# Patient Record
Sex: FEMALE | Race: WHITE | NOT HISPANIC OR LATINO | Employment: OTHER | URBAN - METROPOLITAN AREA
[De-identification: names, ages, dates, MRNs, and addresses within clinical notes are randomized per-mention and may not be internally consistent; named-entity substitution may affect disease eponyms.]

---

## 2021-06-03 PROBLEM — I44.7 LEFT BUNDLE BRANCH BLOCK: Status: ACTIVE | Noted: 2021-06-03

## 2021-06-03 PROBLEM — Z86.59 HISTORY OF DEPRESSION: Status: ACTIVE | Noted: 2021-06-03

## 2021-06-03 PROBLEM — K21.9 GASTROESOPHAGEAL REFLUX DISEASE: Status: ACTIVE | Noted: 2021-06-03

## 2021-06-03 PROBLEM — I10 ESSENTIAL HYPERTENSION: Status: ACTIVE | Noted: 2021-06-03

## 2021-06-03 PROBLEM — E78.00 HYPERCHOLESTEROLEMIA: Status: ACTIVE | Noted: 2020-08-13

## 2021-06-03 PROBLEM — G47.33 OBSTRUCTIVE SLEEP APNEA SYNDROME: Status: ACTIVE | Noted: 2021-06-03

## 2021-06-03 PROBLEM — I25.10 CORONARY ARTERIOSCLEROSIS IN NATIVE ARTERY: Status: ACTIVE | Noted: 2021-06-03

## 2021-06-03 PROBLEM — I50.22 CHRONIC SYSTOLIC HEART FAILURE (HCC): Status: ACTIVE | Noted: 2021-06-03

## 2021-06-03 PROBLEM — I49.3 VENTRICULAR PREMATURE BEATS: Status: ACTIVE | Noted: 2021-06-03

## 2021-06-03 NOTE — PROGRESS NOTES
Chief Complaint  Coronary Artery Disease and Establish Care    Subjective    History of Present Illness {CC  Problem List  Visit  Diagnosis   Encounters  Notes  Medications  Labs  Result Review Imaging  Media :23}     Estefani Jordan presents to [facility] for   History of Present Illness   80-year-old female with hypertension, hyperlipidemia, coronary artery disease s/p stents, chronic systolic heart failure, PVCs status post ablation 2015. Patient has history of syncope and was found to have frequent PVCs around 2013/2014.  Had an abnormal stress test and underwent left heart cath in 2015 showing severe mid LAD stenosis status post drug-eluting stent, EF 25% at the time.  Underwent EPS/PVC ablation in 2015.  Last nuclear stress test was in September 2020 which showed normal LVEF and no evidence of ischemia.    She reports over the past year and a half she has had some worsening exertional dyspnea.  This was around the time that she had her nuclear stress test.  She feels her dyspnea is been stable and wonders if it might be related to her history of lung nodule.  Exertional dyspnea typically happens when she climbs stairs but is able to do her routine activities of daily living.  She reports she saw a pulmonologist and was told that her pulmonary function was decreased.  She notes some intermittent palpitations, worse at night when at rest.  She notes some intermittent feelings of being off balance.  Her  reports that she has had some chronic memory loss and feeling dizzy since she had an intracranial hemorrhage years ago.  She denies any chest pain.  Denies any lower extremity edema, orthopnea or PND. No syncope.  Her  reports that she has had increasing fatigue over the past 6 months and now sleeps for almost the entire day.        Objective     Vital Signs:   Vitals:    06/04/21 1427 06/04/21 1428 06/04/21 1429   BP: 163/72 151/71 162/75   BP Location: Right  "arm Left arm Left arm   Patient Position: Sitting Sitting Standing   Cuff Size: Adult Adult Adult   Pulse: 51 58 67   Resp:   17   Temp:   97.3 °F (36.3 °C)   TempSrc:   Temporal   SpO2: 95% 98% 97%   Weight:   66.2 kg (146 lb)   Height:   154.9 cm (61\")     Body mass index is 27.59 kg/m².  Physical Exam  Vitals reviewed.   Constitutional:       Appearance: Normal appearance.   HENT:      Head: Normocephalic.   Eyes:      Extraocular Movements: Extraocular movements intact.      Pupils: Pupils are equal, round, and reactive to light.   Neck:      Vascular: No carotid bruit.   Cardiovascular:      Rate and Rhythm: Normal rate and regular rhythm. Frequent extrasystoles are present.     Pulses: Normal pulses.      Heart sounds: Normal heart sounds, S1 normal and S2 normal. No murmur heard.     Pulmonary:      Effort: Pulmonary effort is normal. No respiratory distress.      Breath sounds: Normal breath sounds.   Chest:      Chest wall: No tenderness.   Abdominal:      General: Abdomen is flat. Bowel sounds are normal.      Palpations: Abdomen is soft.   Musculoskeletal:      Cervical back: Neck supple.      Right lower leg: No edema.      Left lower leg: No edema.   Skin:     General: Skin is warm and dry.   Neurological:      General: No focal deficit present.      Mental Status: She is alert and oriented to person, place, and time. Mental status is at baseline.   Psychiatric:         Mood and Affect: Mood normal.         Behavior: Behavior normal.         Thought Content: Thought content normal.              Result Review  Data Reviewed:{ Labs  Result Review  Imaging  Med Tab  Media :23}     Reviewed records from [facility] in [place], [place]      Labs 5/12/2021 glucose 95, BUN 19, creatinine 0.88, sodium 141, potassium 4.4, chloride 105, carbon oxide 25, AST, ALT.total cholesterol 132, triglycerides 126, HDL 45, LDL 65.  TSH 0.765      EKG today shows sinus bradycardia with first-degree AV block with " occasional PVCs, left axis deviation, left bundle branch block.  , QTc 495    Assessment and Plan {CC Problem List  Visit Diagnosis  ROS  Review (Popup)  Health Maintenance  Quality  BestPractice  Medications  SmartSets  SnapShot Encounters  Media :23}   1. PVC's (premature ventricular contractions)  History of PVC ablation. Will do holter monitor to re-evaluate PVC burden and see if this is contributing to symptoms  She reports she is on digoxin since her heart cath.  She has no history of A. fib.  At this time, risks likely outweigh the benefit of digoxin and therefore we will go ahead and stop.  We will not increase her metoprolol at this time due to history of first-degree AV block.  Can consider it based on Holter results  - ECG 12 Lead; Future  - Holter Monitor - 72 Hour Up To 15 Days; Future  - Adult Transthoracic Echo Complete W/ Cont if Necessary Per Protocol; Future    2. VALENZUELA (dyspnea on exertion)  Reviewed her records it appears this is been a chronic issue for her.  Possibly could be pulmonary related.  Last echo in 2018.  Will repeat echo to make sure her EF is stable  - Adult Transthoracic Echo Complete W/ Cont if Necessary Per Protocol; Future    3. Coronary arteriosclerosis in native artery  She does have some stable exertional dyspnea but no other symptoms of angina.  Her stress test was normal September 2020.  Continue statin, beta-blocker and aspirin  - Adult Transthoracic Echo Complete W/ Cont if Necessary Per Protocol; Future    4. Chronic systolic heart failure (CMS/HCC)  EF normalized after PVC ablation  - Adult Transthoracic Echo Complete W/ Cont if Necessary Per Protocol; Future    5. Essential hypertension  Mildly elevated today but she reports that blood pressure is typically well controlled and less than 120 when she checks.  I advised them to continue to monitor blood pressure regularly  - Adult Transthoracic Echo Complete W/ Cont if Necessary Per Protocol;  Future    6. Dizziness  It appears this may be a chronic issue and more of an imbalance rather than a dizziness.  Possibly related to history of ICH  - Holter Monitor - 72 Hour Up To 15 Days; Future  - Adult Transthoracic Echo Complete W/ Cont if Necessary Per Protocol; Future    Patient to see Dr. Wright in August    Follow Up {Instructions Charge Capture  Follow-up Communications :23}   Return in about 6 weeks (around 7/16/2021) for Monitor results, Office follow up. Follow up med changes.    Patient was given instructions and counseling regarding her condition or for health maintenance advice. Please see specific information pulled into the AVS if appropriate.  Patient was instructed to call the [facility] with any questions, concerns, or worsening symptoms.    *Please note that portions of this note were completed with a voice recognition program. Efforts were made to edit the dictations, but occasionally words are mistranscribed.

## 2021-06-04 ENCOUNTER — HOSPITAL ENCOUNTER (OUTPATIENT)
Dept: CARDIOLOGY | Facility: HOSPITAL | Age: 81
Discharge: HOME | End: 2021-06-04

## 2021-06-04 ENCOUNTER — OFFICE VISIT (OUTPATIENT)
Dept: CARDIOLOGY | Facility: HOSPITAL | Age: 81
End: 2021-06-04

## 2021-06-04 VITALS
SYSTOLIC BLOOD PRESSURE: 162 MMHG | WEIGHT: 146 LBS | BODY MASS INDEX: 27.56 KG/M2 | HEART RATE: 67 BPM | OXYGEN SATURATION: 97 % | RESPIRATION RATE: 17 BRPM | HEIGHT: 61 IN | DIASTOLIC BLOOD PRESSURE: 75 MMHG | TEMPERATURE: 97.3 F

## 2021-06-04 DIAGNOSIS — I49.3 PVC'S (PREMATURE VENTRICULAR CONTRACTIONS): ICD-10-CM

## 2021-06-04 DIAGNOSIS — R42 DIZZINESS: ICD-10-CM

## 2021-06-04 DIAGNOSIS — I25.10 CORONARY ARTERIOSCLEROSIS IN NATIVE ARTERY: ICD-10-CM

## 2021-06-04 DIAGNOSIS — R06.09 DOE (DYSPNEA ON EXERTION): ICD-10-CM

## 2021-06-04 DIAGNOSIS — I50.22 CHRONIC SYSTOLIC HEART FAILURE (HCC): ICD-10-CM

## 2021-06-04 DIAGNOSIS — I49.3 PVC'S (PREMATURE VENTRICULAR CONTRACTIONS): Primary | ICD-10-CM

## 2021-06-04 DIAGNOSIS — I10 ESSENTIAL HYPERTENSION: ICD-10-CM

## 2021-06-04 LAB
QT INTERVAL: 518 MS
QTC INTERVAL: 495 MS

## 2021-06-04 PROCEDURE — 99204 OFFICE O/P NEW MOD 45 MIN: CPT | Performed by: NURSE PRACTITIONER

## 2021-06-04 PROCEDURE — 93010 ELECTROCARDIOGRAM REPORT: CPT | Performed by: INTERNAL MEDICINE

## 2021-06-04 PROCEDURE — 93005 ELECTROCARDIOGRAM TRACING: CPT | Performed by: NURSE PRACTITIONER

## 2021-06-04 PROCEDURE — 93246 EXT ECG>7D<15D RECORDING: CPT

## 2021-06-04 RX ORDER — METOPROLOL SUCCINATE 100 MG/1
100 TABLET, EXTENDED RELEASE ORAL DAILY
Qty: 90 TABLET | Refills: 1 | Status: SHIPPED | OUTPATIENT
Start: 2021-06-04 | End: 2021-06-04 | Stop reason: SDUPTHER

## 2021-06-04 RX ORDER — METOPROLOL SUCCINATE 50 MG/1
50 TABLET, EXTENDED RELEASE ORAL DAILY
Qty: 90 TABLET | Refills: 0 | Status: SHIPPED | OUTPATIENT
Start: 2021-06-04 | End: 2021-07-08 | Stop reason: SDUPTHER

## 2021-06-04 RX ORDER — DIGOXIN 125 MCG
125 TABLET ORAL DAILY
COMMUNITY
End: 2021-06-04

## 2021-06-04 RX ORDER — ATORVASTATIN CALCIUM 20 MG/1
20 TABLET, FILM COATED ORAL DAILY
COMMUNITY

## 2021-06-04 RX ORDER — ASPIRIN 81 MG/1
81 TABLET ORAL DAILY
COMMUNITY

## 2021-06-04 RX ORDER — LEVOTHYROXINE SODIUM 88 UG/1
88 TABLET ORAL DAILY
COMMUNITY

## 2021-06-04 RX ORDER — CETIRIZINE HYDROCHLORIDE 10 MG/1
10 TABLET ORAL DAILY
COMMUNITY
End: 2022-10-20 | Stop reason: ALTCHOICE

## 2021-06-04 RX ORDER — METOPROLOL SUCCINATE 50 MG/1
50 TABLET, EXTENDED RELEASE ORAL DAILY
COMMUNITY
End: 2021-06-04 | Stop reason: SDUPTHER

## 2021-06-04 RX ORDER — ALENDRONATE SODIUM 70 MG/1
70 TABLET ORAL WEEKLY
COMMUNITY
End: 2022-10-20 | Stop reason: ALTCHOICE

## 2021-06-04 NOTE — PROGRESS NOTES
[facility] Heart Monitor Documentation    Estefani Jordan  1940  [id number]  06/04/21    ROSALBA Rudd    [] ZIO XT Patch  Model [id number] Prescribed for N/A Days    · Serial Number: (N + 9 Digits) N   · Apply-By Date on Box:   · USPS Tracking Number:   · USPS Tracking        [] [employer] BodyGuardian MINI PLUS Mobile Cardiac Telemetry  Model BGMINIPLUS Prescribed for N/A Days    · Serial Number: (BGM + 7 Digits) BGM  · Shipped-By Date on Box:   · UPS Tracking Number: [id number]  · UPS Tracking      [] [employer]ian MINI Holter Monitor  Model BGMINIEL Prescribed for 14 Days    · Serial Number: (7 Digits) [id number]  · Shipped-By Date on Box: 825729  · UPS Tracking Number: [id number]  · UPS Tracking        This monitor was applied to the patient's chest and checked for proper functioning.  Ms. Estefani Jordan was instructed in the proper use of this monitor.  She was given the opportunity to ask questions and left the office with the device 's instruction manual.    Alda Arias MA, 15:17 EDT, 06/04/21                  [facility] 8[facility]

## 2021-07-02 PROCEDURE — 93248 EXT ECG>7D<15D REV&INTERPJ: CPT | Performed by: INTERNAL MEDICINE

## 2021-07-08 ENCOUNTER — TELEPHONE (OUTPATIENT)
Dept: CARDIOLOGY | Facility: HOSPITAL | Age: 81
End: 2021-07-08

## 2021-07-08 RX ORDER — METOPROLOL SUCCINATE 50 MG/1
75 TABLET, EXTENDED RELEASE ORAL DAILY
Refills: 0 | Status: ON HOLD
Start: 2021-07-08 | End: 2021-07-22 | Stop reason: SDUPTHER

## 2021-07-08 NOTE — TELEPHONE ENCOUNTER
Called patient with heart monitor results.  Advised of frequent PVCs.  Discussed with , who recommends increasing metoprolol to 75mg daily.  She reports that she feels the palpitations, especially with activity.  She reports that her heart races with minimal activity.  I advised her to continue to monitor heart rates and if she has dizziness, lightheadedness or heart rates consistently less than 55 to let me know.  If she continues to be symptomatic, may need to consider Multaq.  She has an upcoming echo on Friday.  Patient to keep her follow-up with me next week.  She verbalized understanding with no further questions or concerns.  Of note, I reviewed her labs from May and she had a normal TSH and BMP.  We draw magnesium level when she comes into the office next

## 2021-07-09 ENCOUNTER — HOSPITAL ENCOUNTER (OUTPATIENT)
Dept: CARDIOLOGY | Facility: HOSPITAL | Age: 81
Discharge: HOME | End: 2021-07-09
Admitting: NURSE PRACTITIONER

## 2021-07-09 VITALS
DIASTOLIC BLOOD PRESSURE: 83 MMHG | SYSTOLIC BLOOD PRESSURE: 137 MMHG | WEIGHT: 146 LBS | BODY MASS INDEX: 27.56 KG/M2 | HEIGHT: 61 IN

## 2021-07-09 DIAGNOSIS — R42 DIZZINESS: ICD-10-CM

## 2021-07-09 DIAGNOSIS — I49.3 PVC'S (PREMATURE VENTRICULAR CONTRACTIONS): ICD-10-CM

## 2021-07-09 DIAGNOSIS — I25.10 CORONARY ARTERIOSCLEROSIS IN NATIVE ARTERY: ICD-10-CM

## 2021-07-09 DIAGNOSIS — I50.22 CHRONIC SYSTOLIC HEART FAILURE (HCC): ICD-10-CM

## 2021-07-09 DIAGNOSIS — I10 ESSENTIAL HYPERTENSION: ICD-10-CM

## 2021-07-09 DIAGNOSIS — R06.09 DOE (DYSPNEA ON EXERTION): ICD-10-CM

## 2021-07-09 PROCEDURE — 25010000002 SULFUR HEXAFLUORIDE MICROSPH 60.7-25 MG RECONSTITUTED SUSPENSION: Performed by: NURSE PRACTITIONER

## 2021-07-09 PROCEDURE — 93306 TTE W/DOPPLER COMPLETE: CPT | Performed by: INTERNAL MEDICINE

## 2021-07-09 PROCEDURE — 93306 TTE W/DOPPLER COMPLETE: CPT

## 2021-07-09 RX ADMIN — SULFUR HEXAFLUORIDE 2 ML: KIT at 17:33

## 2021-07-12 ENCOUNTER — TELEPHONE (OUTPATIENT)
Dept: CARDIOLOGY | Facility: HOSPITAL | Age: 81
End: 2021-07-12

## 2021-07-12 DIAGNOSIS — I25.708 CORONARY ARTERY DISEASE OF BYPASS GRAFT OF NATIVE HEART WITH STABLE ANGINA PECTORIS (HCC): ICD-10-CM

## 2021-07-12 DIAGNOSIS — I49.3 FREQUENT PVCS: ICD-10-CM

## 2021-07-12 DIAGNOSIS — I50.21 ACUTE SYSTOLIC CHF (CONGESTIVE HEART FAILURE) (HCC): Primary | ICD-10-CM

## 2021-07-12 LAB
BH CV ECHO MEAS - AO MAX PG (FULL): 7.5 MMHG
BH CV ECHO MEAS - AO MAX PG: 8.9 MMHG
BH CV ECHO MEAS - AO MEAN PG (FULL): 3.3 MMHG
BH CV ECHO MEAS - AO MEAN PG: 4.1 MMHG
BH CV ECHO MEAS - AO ROOT AREA (BSA CORRECTED): 1.8
BH CV ECHO MEAS - AO ROOT AREA: 7.2 CM^2
BH CV ECHO MEAS - AO ROOT DIAM: 3 CM
BH CV ECHO MEAS - AO V2 MAX: 149.3 CM/SEC
BH CV ECHO MEAS - AO V2 MEAN: 92.6 CM/SEC
BH CV ECHO MEAS - AO V2 VTI: 39.5 CM
BH CV ECHO MEAS - ASC AORTA: 3 CM
BH CV ECHO MEAS - AVA(I,A): 1.7 CM^2
BH CV ECHO MEAS - AVA(I,D): 1.7 CM^2
BH CV ECHO MEAS - AVA(V,A): 1.7 CM^2
BH CV ECHO MEAS - AVA(V,D): 1.7 CM^2
BH CV ECHO MEAS - BSA(HAYCOCK): 1.7 M^2
BH CV ECHO MEAS - BSA: 1.7 M^2
BH CV ECHO MEAS - BZI_BMI: 27.6 KILOGRAMS/M^2
BH CV ECHO MEAS - BZI_METRIC_HEIGHT: 154.9 CM
BH CV ECHO MEAS - BZI_METRIC_WEIGHT: 66.2 KG
BH CV ECHO MEAS - EDV(CUBED): 157.1 ML
BH CV ECHO MEAS - EDV(MOD-SP2): 132 ML
BH CV ECHO MEAS - EDV(MOD-SP4): 156 ML
BH CV ECHO MEAS - EDV(TEICH): 141 ML
BH CV ECHO MEAS - EF(CUBED): 42 %
BH CV ECHO MEAS - EF(MOD-BP): 30 %
BH CV ECHO MEAS - EF(MOD-SP2): 27.3 %
BH CV ECHO MEAS - EF(MOD-SP4): 30.1 %
BH CV ECHO MEAS - EF(TEICH): 34.4 %
BH CV ECHO MEAS - ESV(CUBED): 91.1 ML
BH CV ECHO MEAS - ESV(MOD-SP2): 96 ML
BH CV ECHO MEAS - ESV(MOD-SP4): 109 ML
BH CV ECHO MEAS - ESV(TEICH): 92.5 ML
BH CV ECHO MEAS - FS: 16.6 %
BH CV ECHO MEAS - IVS/LVPW: 0.99
BH CV ECHO MEAS - IVSD: 1 CM
BH CV ECHO MEAS - LA DIMENSION: 4.1 CM
BH CV ECHO MEAS - LA/AO: 1.3
BH CV ECHO MEAS - LAD MAJOR: 5.4 CM
BH CV ECHO MEAS - LAT PEAK E' VEL: 5.6 CM/SEC
BH CV ECHO MEAS - LATERAL E/E' RATIO: 17.5
BH CV ECHO MEAS - LV DIASTOLIC VOL/BSA (35-75): 94.4 ML/M^2
BH CV ECHO MEAS - LV MASS(C)D: 221.1 GRAMS
BH CV ECHO MEAS - LV MASS(C)DI: 133.8 GRAMS/M^2
BH CV ECHO MEAS - LV MAX PG: 1.4 MMHG
BH CV ECHO MEAS - LV MEAN PG: 0.81 MMHG
BH CV ECHO MEAS - LV SYSTOLIC VOL/BSA (12-30): 66 ML/M^2
BH CV ECHO MEAS - LV V1 MAX: 59.7 CM/SEC
BH CV ECHO MEAS - LV V1 MEAN: 42.7 CM/SEC
BH CV ECHO MEAS - LV V1 VTI: 15.3 CM
BH CV ECHO MEAS - LVIDD: 5.4 CM
BH CV ECHO MEAS - LVIDS: 4.5 CM
BH CV ECHO MEAS - LVLD AP2: 7.8 CM
BH CV ECHO MEAS - LVLD AP4: 7.4 CM
BH CV ECHO MEAS - LVLS AP2: 7 CM
BH CV ECHO MEAS - LVLS AP4: 6.9 CM
BH CV ECHO MEAS - LVOT AREA (M): 4.2 CM^2
BH CV ECHO MEAS - LVOT AREA: 4.2 CM^2
BH CV ECHO MEAS - LVOT DIAM: 2.3 CM
BH CV ECHO MEAS - LVPWD: 1.1 CM
BH CV ECHO MEAS - MED PEAK E' VEL: 3.2 CM/SEC
BH CV ECHO MEAS - MEDIAL E/E' RATIO: 31.2
BH CV ECHO MEAS - MR ALIAS VEL: 31.9 CM/SEC
BH CV ECHO MEAS - MR ERO: 0.14 CM^2
BH CV ECHO MEAS - MR FLOW RATE: 73.3 CM^3/SEC
BH CV ECHO MEAS - MR MAX PG: 104 MMHG
BH CV ECHO MEAS - MR MAX VEL: 505.9 CM/SEC
BH CV ECHO MEAS - MR MEAN PG: 62.3 MMHG
BH CV ECHO MEAS - MR MEAN VEL: 368.4 CM/SEC
BH CV ECHO MEAS - MR PISA RADIUS: 0.6 CM
BH CV ECHO MEAS - MR PISA: 2.3 CM^2
BH CV ECHO MEAS - MR VOLUME: 33.7 ML
BH CV ECHO MEAS - MR VTI: 232.7 CM
BH CV ECHO MEAS - MV A MAX VEL: 101.2 CM/SEC
BH CV ECHO MEAS - MV AREA (1 DIAM): 9.1 CM^2
BH CV ECHO MEAS - MV DEC SLOPE: 357.3 CM/SEC^2
BH CV ECHO MEAS - MV DEC TIME: 0.16 SEC
BH CV ECHO MEAS - MV DIAM: 3.4 CM
BH CV ECHO MEAS - MV E MAX VEL: 100.7 CM/SEC
BH CV ECHO MEAS - MV E/A: 1
BH CV ECHO MEAS - MV FLOW AREA(1DIAM): 9.1 CM^2
BH CV ECHO MEAS - MV MAX PG: 3.9 MMHG
BH CV ECHO MEAS - MV MEAN PG: 1.9 MMHG
BH CV ECHO MEAS - MV P1/2T MAX VEL: 82.8 CM/SEC
BH CV ECHO MEAS - MV P1/2T: 67.9 MSEC
BH CV ECHO MEAS - MV V2 MAX: 98.4 CM/SEC
BH CV ECHO MEAS - MV V2 MEAN: 65 CM/SEC
BH CV ECHO MEAS - MV V2 VTI: 39.4 CM
BH CV ECHO MEAS - MVA P1/2T LCG: 2.7 CM^2
BH CV ECHO MEAS - MVA(P1/2T): 3.2 CM^2
BH CV ECHO MEAS - MVA(VTI): 1.7 CM^2
BH CV ECHO MEAS - PA ACC SLOPE: 371.6 CM/SEC^2
BH CV ECHO MEAS - PA ACC TIME: 0.16 SEC
BH CV ECHO MEAS - PA MAX PG: 2.1 MMHG
BH CV ECHO MEAS - PA PR(ACCEL): 5.3 MMHG
BH CV ECHO MEAS - PA V2 MAX: 71.1 CM/SEC
BH CV ECHO MEAS - RAP SYSTOLE: 8 MMHG
BH CV ECHO MEAS - RF(MV,AO)(1 DIAM): 0.2
BH CV ECHO MEAS - RF(MV,LVOT)(1DIAM): 0.82
BH CV ECHO MEAS - RVDD: 1.4 CM
BH CV ECHO MEAS - RVSP: 30 MMHG
BH CV ECHO MEAS - SI(AO): 172.3 ML/M^2
BH CV ECHO MEAS - SI(CUBED): 39.9 ML/M^2
BH CV ECHO MEAS - SI(LVOT): 39.4 ML/M^2
BH CV ECHO MEAS - SI(MOD-SP2): 21.8 ML/M^2
BH CV ECHO MEAS - SI(MOD-SP4): 28.4 ML/M^2
BH CV ECHO MEAS - SI(MV 1 DIAM): 216.3 ML/M^2
BH CV ECHO MEAS - SI(TEICH): 29.4 ML/M^2
BH CV ECHO MEAS - SV(AO): 284.8 ML
BH CV ECHO MEAS - SV(CUBED): 65.9 ML
BH CV ECHO MEAS - SV(LVOT): 65.2 ML
BH CV ECHO MEAS - SV(MOD-SP2): 36 ML
BH CV ECHO MEAS - SV(MOD-SP4): 47 ML
BH CV ECHO MEAS - SV(MV 1 DIAM): 357.5 ML
BH CV ECHO MEAS - SV(TEICH): 48.6 ML
BH CV ECHO MEAS - TAPSE (>1.6): 2.2 CM
BH CV ECHO MEAS - TR MAX PG: 22 MMHG
BH CV ECHO MEAS - TR MAX VEL: 235 CM/SEC
BH CV ECHO MEAS - TV MAX PG: 0.79 MMHG
BH CV ECHO MEAS - TV V2 MAX: 44.4 CM/SEC
BH CV ECHO MEASUREMENTS AVERAGE E/E' RATIO: 22.89
BH CV VAS BP RIGHT ARM: NORMAL MMHG
BH CV XLRA - RV BASE: 3.3 CM
BH CV XLRA - RV LENGTH: 5.5 CM
BH CV XLRA - RV MID: 3 CM
BH CV XLRA - TDI S': 9.25 CM/SEC
LEFT ATRIUM VOLUME INDEX: 30.3 ML/M^2
LEFT ATRIUM VOLUME: 50 ML
MAXIMAL PREDICTED HEART RATE: 142 BPM
STRESS TARGET HR: 121 BPM

## 2021-07-12 RX ORDER — SACUBITRIL AND VALSARTAN 24; 26 MG/1; MG/1
1 TABLET, FILM COATED ORAL 2 TIMES DAILY
Qty: 60 TABLET | Refills: 3 | Status: SHIPPED | OUTPATIENT
Start: 2021-07-12 | End: 2022-02-07 | Stop reason: SDUPTHER

## 2021-07-12 NOTE — TELEPHONE ENCOUNTER
Called patient with echo results. Echo showed that EF has declined again to 21 to 25%. Moderate to severe MR also noted. Discussed with  who recommends to proceed with left heart cath. Discussed this with patient and she agrees to proceed. She reports having multiple left heart cath in the past and understands risk versus benefits. Discussed risk for sudden cardiac death and recommend LifeVest. She also agrees to proceed with this. We will add Entresto 24/26 mg twice daily. Discussed potential side effects and when to call. Patient to follow-up with me on Monday for repeat labs and further evaluation. She verbalized understanding with no further questions or concerns

## 2021-07-14 NOTE — TELEPHONE ENCOUNTER
I didn't call her. However, cardiology may have tried to reach her regarding scheduling her heart cath with Dr. Wright.

## 2021-07-15 ENCOUNTER — TELEPHONE (OUTPATIENT)
Dept: CARDIOLOGY | Facility: HOSPITAL | Age: 81
End: 2021-07-15

## 2021-07-15 PROBLEM — I50.21 ACUTE SYSTOLIC CHF (CONGESTIVE HEART FAILURE): Status: ACTIVE | Noted: 2021-07-15

## 2021-07-15 PROBLEM — I49.3 FREQUENT PVCS: Status: ACTIVE | Noted: 2021-07-15

## 2021-07-15 PROBLEM — I25.708 CORONARY ARTERY DISEASE OF BYPASS GRAFT OF NATIVE HEART WITH STABLE ANGINA PECTORIS: Status: ACTIVE | Noted: 2021-07-15

## 2021-07-15 RX ORDER — PREDNISONE 50 MG/1
TABLET ORAL
Qty: 3 TABLET | Refills: 0 | Status: SHIPPED | OUTPATIENT
Start: 2021-07-15 | End: 2021-08-04

## 2021-07-15 NOTE — TELEPHONE ENCOUNTER
Attempted to call patient.  I left message that I would be sending her in a prescription for prednisone to start 13, 7 and 1 hours prior to her heart cath in addition to diphenhydramine 50 mg 1 hour prior to heart cath.  I advised her to call me with any questions or concerns.  I also explained to her that I would review this with her on Monday.

## 2021-07-15 NOTE — PROGRESS NOTES
Chief Complaint  Follow-up (Monitor results)    Subjective    History of Present Illness {CC  Problem List  Visit  Diagnosis   Encounters  Notes  Medications  Labs  Result Review Imaging  Media :23}     Estefani Jordan presents to [facility] CARDIOLOGY for   History of Present Illness   80-year-old female with hypertension, hyperlipidemia, coronary artery disease s/p stents, chronic systolic heart failure, PVCs status post ablation 2015 who presents today to follow up on frequent PVCs and chronic systolic heart failure.  Patient recently moved to [place] from [facility].  Patient has history of syncope and was found to have frequent PVCs around 2013/2014.  Had an abnormal stress test and underwent left heart cath in 2015 showing severe mid LAD stenosis status post drug-eluting stent, EF 25% at the time.  Underwent EPS/PVC ablation in 2015.  Last nuclear stress test was in September 2020 which showed normal LVEF and no evidence of ischemia.    Due to worsening exertional dyspnea she was placed in an extended Holter monitor which showed a 22% PVC burden with multiple runs of NSVT, longest 5 beats.  Echo showed EF of 21 to 25%, moderate to severe mitral valve regurgitation present.  Her metoprolol succinate was increased from 50 to 75 mg daily.  She was started on Entresto.  She was also placed in a LifeVest.  A left heart cath has been ordered.    Her daughter is present today, who is a Rn  She reports recently over the past couple weeks has had a 7 pound weight gain and has noticed some abdominal bloating, especially at night.  She notes some mild orthopnea but denies PND.  She denies needing to elevate the head of bed.  She is able to sleep through the night.  She denies any dizziness or lightheadedness.  She does note palpitations.  She reports that when she had her PVC ablation she was told that there were areas that could not be fixed and that her PVCs would  "recur.      Objective     Vital Signs:   Vitals:    21 1259   BP: 125/93   BP Location: Left arm   Patient Position: Sitting   Cuff Size: Adult   Pulse: 72   Resp: 18   Temp: 97.8 °F (36.6 °C)   TempSrc: Temporal   SpO2: 97%   Weight: 70.1 kg (154 lb 8 oz)   Height: 154.9 cm (61\")     Body mass index is 29.19 kg/m².  Physical Exam  Vitals reviewed.   Constitutional:       Appearance: Normal appearance.   HENT:      Head: Normocephalic.   Eyes:      Extraocular Movements: Extraocular movements intact.      Pupils: Pupils are equal, round, and reactive to light.   Neck:      Vascular: No carotid bruit.   Cardiovascular:      Rate and Rhythm: Normal rate and regular rhythm. Frequent extrasystoles are present.     Pulses: Normal pulses.      Heart sounds: Normal heart sounds, S1 normal and S2 normal. No murmur heard.     Pulmonary:      Effort: Pulmonary effort is normal. No respiratory distress.      Breath sounds: Rales present.   Chest:      Chest wall: No tenderness.   Abdominal:      General: Abdomen is flat. Bowel sounds are normal.      Palpations: Abdomen is soft.   Musculoskeletal:      Cervical back: Neck supple.      Right lower le+ Edema present.      Left lower le+ Edema present.   Skin:     General: Skin is warm and dry.   Neurological:      General: No focal deficit present.      Mental Status: She is alert and oriented to person, place, and time. Mental status is at baseline.   Psychiatric:         Mood and Affect: Mood normal.         Behavior: Behavior normal.         Thought Content: Thought content normal.              Result Review  Data Reviewed:{ Labs  Result Review  Imaging  Med Tab  Media :23}     Extended Holter monitor 2021 showed frequent PVCs with a burden of 22%, 778 NSVT runs with the longest lasting 5 beats.  Average heart 65, minimum heart 51 and a maximum heart of 164 bpm.      Labs 2021 glucose 95, BUN 19, creatinine 0.88, sodium 141, potassium 4.4, chloride " 105, carbon oxide 25, AST, ALT.total cholesterol 132, triglycerides 126, HDL 45, LDL 65.  TSH 0.765      EKG today shows NSR with first-degree AV block with occasional PVCs,left bundle branch block.  , QTc 480 (Bazett's calculation)    Assessment and Plan {CC Problem List  Visit Diagnosis  ROS  Review (Popup)  Health Maintenance  Quality  BestPractice  Medications  SmartSets  SnapShot Encounters  Media :23}   1. Frequent  PVC's (premature ventricular contractions)  History of PVC ablation in [place], [place] in 2015.  Recent Holter shows 22% burden  Metoprolol succinate increased to 75 mg daily, continue.  Unable to uptitrate further due to low resting heart rates and first-degree AV block    2. Acute on chronic systolic heart failure  Start Lasix 40 mg daily with 20 mg of potassium.  Check chest x-ray today  Continue Entresto and metoprolol succinate.  She was on spironolactone in the past, will consider resuming when she is more euvolemic and labs are stable  CMP, proBNP and magnesium today    3. Coronary arteriosclerosis in native artery  Scheduled for left heart cath 7/22.  Provided instructions on pretreatment secondary to contrast dye allergy.  Patient to start prednisone 13 hours and then 7 hours before left heart cath.  Bring last dose of prednisone with 50 mg of Benadryl to left heart cath to take 1 hour prior  Continue beta-blocker, aspirin, statin    4. Essential hypertension  Well controlled  Continue to monitor    Keep scheduled left heart cath for 7/22    Follow Up {Instructions Charge Capture  Follow-up Communications :23}   Return if symptoms worsen or fail to improve.    Patient was given instructions and counseling regarding her condition or for health maintenance advice. Please see specific information pulled into the AVS if appropriate.  Patient was instructed to call the [facility] with any questions, concerns, or worsening symptoms.    *Please note that portions  of this note were completed with a voice recognition program. Efforts were made to edit the dictations, but occasionally words are mistranscribed.

## 2021-07-15 NOTE — TELEPHONE ENCOUNTER
Called and advised patient Janice NAVARRETE did not attempt to contact her, however, cardiology may have attempted to contact her to schedule her heart cath. Patient verbalized understanding, transferred patient to [facility] for discussion regarding procedure.

## 2021-07-19 ENCOUNTER — PREP FOR SURGERY (OUTPATIENT)
Dept: OTHER | Facility: HOSPITAL | Age: 81
End: 2021-07-19

## 2021-07-19 ENCOUNTER — LAB (OUTPATIENT)
Dept: LAB | Facility: HOSPITAL | Age: 81
End: 2021-07-19

## 2021-07-19 ENCOUNTER — HOSPITAL ENCOUNTER (OUTPATIENT)
Dept: GENERAL RADIOLOGY | Facility: HOSPITAL | Age: 81
Discharge: HOME | End: 2021-07-19

## 2021-07-19 ENCOUNTER — HOSPITAL ENCOUNTER (OUTPATIENT)
Dept: CARDIOLOGY | Facility: HOSPITAL | Age: 81
Discharge: HOME | End: 2021-07-19

## 2021-07-19 ENCOUNTER — OFFICE VISIT (OUTPATIENT)
Dept: CARDIOLOGY | Facility: HOSPITAL | Age: 81
End: 2021-07-19

## 2021-07-19 VITALS
WEIGHT: 154.5 LBS | HEART RATE: 72 BPM | TEMPERATURE: 97.8 F | SYSTOLIC BLOOD PRESSURE: 125 MMHG | OXYGEN SATURATION: 97 % | RESPIRATION RATE: 18 BRPM | BODY MASS INDEX: 29.17 KG/M2 | HEIGHT: 61 IN | DIASTOLIC BLOOD PRESSURE: 93 MMHG

## 2021-07-19 DIAGNOSIS — I25.118 CORONARY ARTERY DISEASE OF NATIVE ARTERY OF NATIVE HEART WITH STABLE ANGINA PECTORIS (HCC): Primary | ICD-10-CM

## 2021-07-19 DIAGNOSIS — I50.23 ACUTE ON CHRONIC SYSTOLIC CHF (CONGESTIVE HEART FAILURE) (HCC): ICD-10-CM

## 2021-07-19 DIAGNOSIS — I49.3 FREQUENT PVCS: ICD-10-CM

## 2021-07-19 DIAGNOSIS — I10 ESSENTIAL HYPERTENSION: ICD-10-CM

## 2021-07-19 DIAGNOSIS — I49.3 FREQUENT PVCS: Primary | ICD-10-CM

## 2021-07-19 LAB
QT INTERVAL: 520 MS
QTC INTERVAL: 520 MS

## 2021-07-19 PROCEDURE — 83735 ASSAY OF MAGNESIUM: CPT

## 2021-07-19 PROCEDURE — 80053 COMPREHEN METABOLIC PANEL: CPT

## 2021-07-19 PROCEDURE — 93010 ELECTROCARDIOGRAM REPORT: CPT | Performed by: INTERNAL MEDICINE

## 2021-07-19 PROCEDURE — 99214 OFFICE O/P EST MOD 30 MIN: CPT | Performed by: NURSE PRACTITIONER

## 2021-07-19 PROCEDURE — 93005 ELECTROCARDIOGRAM TRACING: CPT | Performed by: NURSE PRACTITIONER

## 2021-07-19 PROCEDURE — 36415 COLL VENOUS BLD VENIPUNCTURE: CPT

## 2021-07-19 PROCEDURE — 71046 X-RAY EXAM CHEST 2 VIEWS: CPT

## 2021-07-19 PROCEDURE — 83880 ASSAY OF NATRIURETIC PEPTIDE: CPT

## 2021-07-19 RX ORDER — MONTELUKAST SODIUM 10 MG/1
10 TABLET ORAL
COMMUNITY
Start: 2021-07-01

## 2021-07-19 RX ORDER — ASPIRIN 81 MG/1
81 TABLET ORAL DAILY
Status: CANCELLED | OUTPATIENT
Start: 2021-07-20

## 2021-07-19 RX ORDER — SODIUM CHLORIDE 0.9 % (FLUSH) 0.9 %
10 SYRINGE (ML) INJECTION AS NEEDED
Status: CANCELLED | OUTPATIENT
Start: 2021-07-19

## 2021-07-19 RX ORDER — POTASSIUM CHLORIDE 750 MG/1
20 TABLET, FILM COATED, EXTENDED RELEASE ORAL DAILY
Qty: 30 TABLET | Refills: 0 | Status: SHIPPED | OUTPATIENT
Start: 2021-07-19 | End: 2021-07-20 | Stop reason: SDUPTHER

## 2021-07-19 RX ORDER — SODIUM CHLORIDE 0.9 % (FLUSH) 0.9 %
3 SYRINGE (ML) INJECTION EVERY 12 HOURS SCHEDULED
Status: CANCELLED | OUTPATIENT
Start: 2021-07-19

## 2021-07-19 RX ORDER — ASPIRIN 81 MG/1
324 TABLET, CHEWABLE ORAL ONCE
Status: CANCELLED | OUTPATIENT
Start: 2021-07-19 | End: 2021-07-19

## 2021-07-19 RX ORDER — FUROSEMIDE 20 MG/1
40 TABLET ORAL DAILY
Qty: 60 TABLET | Refills: 0 | Status: SHIPPED | OUTPATIENT
Start: 2021-07-19 | End: 2021-07-20 | Stop reason: SDUPTHER

## 2021-07-20 ENCOUNTER — TELEPHONE (OUTPATIENT)
Dept: CARDIOLOGY | Facility: HOSPITAL | Age: 81
End: 2021-07-20

## 2021-07-20 LAB
ALBUMIN SERPL-MCNC: 4.3 G/DL (ref 3.5–5.2)
ALBUMIN/GLOB SERPL: 1.5 G/DL
ALP SERPL-CCNC: 88 U/L (ref 39–117)
ALT SERPL W P-5'-P-CCNC: 22 U/L (ref 1–33)
ANION GAP SERPL CALCULATED.3IONS-SCNC: 6.9 MMOL/L (ref 5–15)
AST SERPL-CCNC: 23 U/L (ref 1–32)
BILIRUB SERPL-MCNC: 0.4 MG/DL (ref 0–1.2)
BUN SERPL-MCNC: 19 MG/DL (ref 8–23)
BUN/CREAT SERPL: 22.4 (ref 7–25)
CALCIUM SPEC-SCNC: 8.8 MG/DL (ref 8.6–10.5)
CHLORIDE SERPL-SCNC: 108 MMOL/L (ref 98–107)
CO2 SERPL-SCNC: 25.1 MMOL/L (ref 22–29)
CREAT SERPL-MCNC: 0.85 MG/DL (ref 0.57–1)
GFR SERPL CREATININE-BSD FRML MDRD: 65 ML/MIN/1.73
GLOBULIN UR ELPH-MCNC: 2.8 GM/DL
GLUCOSE SERPL-MCNC: 87 MG/DL (ref 65–99)
MAGNESIUM SERPL-MCNC: 2.3 MG/DL (ref 1.6–2.4)
NT-PROBNP SERPL-MCNC: 1275 PG/ML (ref 0–1800)
POTASSIUM SERPL-SCNC: 4.9 MMOL/L (ref 3.5–5.2)
PROT SERPL-MCNC: 7.1 G/DL (ref 6–8.5)
SODIUM SERPL-SCNC: 140 MMOL/L (ref 136–145)

## 2021-07-20 RX ORDER — FUROSEMIDE 20 MG/1
40 TABLET ORAL DAILY
Qty: 60 TABLET | Refills: 0
Start: 2021-07-20 | End: 2021-08-19 | Stop reason: SDUPTHER

## 2021-07-20 RX ORDER — POTASSIUM CHLORIDE 750 MG/1
10 TABLET, FILM COATED, EXTENDED RELEASE ORAL DAILY
Qty: 30 TABLET | Refills: 0
Start: 2021-07-20 | End: 2021-08-19 | Stop reason: SDUPTHER

## 2021-07-20 NOTE — TELEPHONE ENCOUNTER
Called patient with lab and chest x-ray results.  Chest x-ray was normal, labs stable.  I advised her to only take 40 of Lasix with only 10 mEq of potassium up until Wednesday and then stop.  After procedure can take 20 mg of Lasix and stop potassium.  She reports that she is already feeling better this morning and that her abdominal bloating has improved.  I advised her to call with any new or worsening symptoms.  She verbalized understanding with no further questions or concerns

## 2021-07-22 ENCOUNTER — TELEPHONE (OUTPATIENT)
Dept: CARDIOLOGY | Facility: CLINIC | Age: 81
End: 2021-07-22

## 2021-07-22 ENCOUNTER — HOSPITAL ENCOUNTER (OUTPATIENT)
Facility: HOSPITAL | Age: 81
Setting detail: HOSPITAL OUTPATIENT SURGERY
Discharge: HOME | End: 2021-07-22
Attending: INTERNAL MEDICINE | Admitting: NURSE PRACTITIONER

## 2021-07-22 VITALS
OXYGEN SATURATION: 97 % | SYSTOLIC BLOOD PRESSURE: 119 MMHG | RESPIRATION RATE: 18 BRPM | HEART RATE: 59 BPM | BODY MASS INDEX: 28.3 KG/M2 | DIASTOLIC BLOOD PRESSURE: 57 MMHG | HEIGHT: 61 IN | TEMPERATURE: 97.4 F | WEIGHT: 149.91 LBS

## 2021-07-22 DIAGNOSIS — I44.7 LEFT BUNDLE BRANCH BLOCK: ICD-10-CM

## 2021-07-22 DIAGNOSIS — I25.708 CORONARY ARTERY DISEASE OF BYPASS GRAFT OF NATIVE HEART WITH STABLE ANGINA PECTORIS (HCC): ICD-10-CM

## 2021-07-22 DIAGNOSIS — I50.21 ACUTE SYSTOLIC CHF (CONGESTIVE HEART FAILURE) (HCC): ICD-10-CM

## 2021-07-22 DIAGNOSIS — I49.3 FREQUENT PVCS: Primary | ICD-10-CM

## 2021-07-22 DIAGNOSIS — I49.3 VENTRICULAR PREMATURE BEATS: Primary | ICD-10-CM

## 2021-07-22 DIAGNOSIS — I50.22 CHRONIC SYSTOLIC HEART FAILURE (HCC): ICD-10-CM

## 2021-07-22 DIAGNOSIS — I49.3 FREQUENT PVCS: ICD-10-CM

## 2021-07-22 DIAGNOSIS — I25.118 CORONARY ARTERY DISEASE OF NATIVE ARTERY OF NATIVE HEART WITH STABLE ANGINA PECTORIS (HCC): ICD-10-CM

## 2021-07-22 LAB
ANION GAP SERPL CALCULATED.3IONS-SCNC: 12 MMOL/L (ref 5–15)
BUN SERPL-MCNC: 29 MG/DL (ref 8–23)
BUN/CREAT SERPL: 33 (ref 7–25)
CALCIUM SPEC-SCNC: 9.3 MG/DL (ref 8.6–10.5)
CHLORIDE SERPL-SCNC: 103 MMOL/L (ref 98–107)
CHOLEST SERPL-MCNC: 144 MG/DL (ref 0–200)
CO2 SERPL-SCNC: 24 MMOL/L (ref 22–29)
CREAT SERPL-MCNC: 0.88 MG/DL (ref 0.57–1)
DEPRECATED RDW RBC AUTO: 46.1 FL (ref 37–54)
ERYTHROCYTE [DISTWIDTH] IN BLOOD BY AUTOMATED COUNT: 13.2 % (ref 12.3–15.4)
GFR SERPL CREATININE-BSD FRML MDRD: 62 ML/MIN/1.73
GLUCOSE SERPL-MCNC: 183 MG/DL (ref 65–99)
HBA1C MFR BLD: 6.2 % (ref 4.8–5.6)
HCT VFR BLD AUTO: 47.5 % (ref 34–46.6)
HDLC SERPL-MCNC: 61 MG/DL (ref 40–60)
HGB BLD-MCNC: 15.5 G/DL (ref 12–15.9)
LDLC SERPL CALC-MCNC: 72 MG/DL (ref 0–100)
LDLC/HDLC SERPL: 1.2 {RATIO}
MCH RBC QN AUTO: 31.1 PG (ref 26.6–33)
MCHC RBC AUTO-ENTMCNC: 32.6 G/DL (ref 31.5–35.7)
MCV RBC AUTO: 95.2 FL (ref 79–97)
PLATELET # BLD AUTO: 218 10*3/MM3 (ref 140–450)
PMV BLD AUTO: 10.5 FL (ref 6–12)
POTASSIUM SERPL-SCNC: 5 MMOL/L (ref 3.5–5.2)
RBC # BLD AUTO: 4.99 10*6/MM3 (ref 3.77–5.28)
SODIUM SERPL-SCNC: 139 MMOL/L (ref 136–145)
TRIGL SERPL-MCNC: 49 MG/DL (ref 0–150)
VLDLC SERPL-MCNC: 11 MG/DL (ref 5–40)
WBC # BLD AUTO: 7.15 10*3/MM3 (ref 3.4–10.8)

## 2021-07-22 PROCEDURE — C1769 GUIDE WIRE: HCPCS | Performed by: INTERNAL MEDICINE

## 2021-07-22 PROCEDURE — 93458 L HRT ARTERY/VENTRICLE ANGIO: CPT | Performed by: INTERNAL MEDICINE

## 2021-07-22 PROCEDURE — 25010000002 FENTANYL CITRATE (PF) 50 MCG/ML SOLUTION: Performed by: INTERNAL MEDICINE

## 2021-07-22 PROCEDURE — 25010000002 MIDAZOLAM PER 1 MG: Performed by: INTERNAL MEDICINE

## 2021-07-22 PROCEDURE — 80061 LIPID PANEL: CPT | Performed by: NURSE PRACTITIONER

## 2021-07-22 PROCEDURE — C1894 INTRO/SHEATH, NON-LASER: HCPCS | Performed by: INTERNAL MEDICINE

## 2021-07-22 PROCEDURE — 0 IOPAMIDOL PER 1 ML: Performed by: INTERNAL MEDICINE

## 2021-07-22 PROCEDURE — 80048 BASIC METABOLIC PNL TOTAL CA: CPT | Performed by: NURSE PRACTITIONER

## 2021-07-22 PROCEDURE — 85027 COMPLETE CBC AUTOMATED: CPT | Performed by: NURSE PRACTITIONER

## 2021-07-22 PROCEDURE — 25010000002 HEPARIN (PORCINE) PER 1000 UNITS: Performed by: INTERNAL MEDICINE

## 2021-07-22 PROCEDURE — 83036 HEMOGLOBIN GLYCOSYLATED A1C: CPT | Performed by: NURSE PRACTITIONER

## 2021-07-22 RX ORDER — DAPAGLIFLOZIN 5 MG/1
5 TABLET, FILM COATED ORAL DAILY
Qty: 30 TABLET | Refills: 5 | Status: SHIPPED | OUTPATIENT
Start: 2021-07-22 | End: 2021-10-05 | Stop reason: SDUPTHER

## 2021-07-22 RX ORDER — LIDOCAINE HYDROCHLORIDE 10 MG/ML
INJECTION, SOLUTION EPIDURAL; INFILTRATION; INTRACAUDAL; PERINEURAL AS NEEDED
Status: DISCONTINUED | OUTPATIENT
Start: 2021-07-22 | End: 2021-07-22 | Stop reason: HOSPADM

## 2021-07-22 RX ORDER — SODIUM CHLORIDE 9 MG/ML
3 INJECTION, SOLUTION INTRAVENOUS CONTINUOUS
Status: DISCONTINUED | OUTPATIENT
Start: 2021-07-22 | End: 2021-07-22 | Stop reason: HOSPADM

## 2021-07-22 RX ORDER — ACETAMINOPHEN 325 MG/1
650 TABLET ORAL EVERY 4 HOURS PRN
Status: DISCONTINUED | OUTPATIENT
Start: 2021-07-22 | End: 2021-07-22 | Stop reason: HOSPADM

## 2021-07-22 RX ORDER — PHENYLEPHRINE HCL IN 0.9% NACL 1 MG/10 ML
SYRINGE (ML) INTRAVENOUS AS NEEDED
Status: DISCONTINUED | OUTPATIENT
Start: 2021-07-22 | End: 2021-07-22 | Stop reason: HOSPADM

## 2021-07-22 RX ORDER — ASPIRIN 81 MG/1
324 TABLET, CHEWABLE ORAL ONCE
Status: COMPLETED | OUTPATIENT
Start: 2021-07-22 | End: 2021-07-22

## 2021-07-22 RX ORDER — SODIUM CHLORIDE 0.9 % (FLUSH) 0.9 %
3 SYRINGE (ML) INJECTION EVERY 12 HOURS SCHEDULED
Status: DISCONTINUED | OUTPATIENT
Start: 2021-07-22 | End: 2021-07-22 | Stop reason: HOSPADM

## 2021-07-22 RX ORDER — FLUTICASONE PROPIONATE 50 MCG
2 SPRAY, SUSPENSION (ML) NASAL DAILY
COMMUNITY

## 2021-07-22 RX ORDER — FENTANYL CITRATE 50 UG/ML
INJECTION, SOLUTION INTRAMUSCULAR; INTRAVENOUS AS NEEDED
Status: DISCONTINUED | OUTPATIENT
Start: 2021-07-22 | End: 2021-07-22 | Stop reason: HOSPADM

## 2021-07-22 RX ORDER — ASPIRIN 81 MG/1
81 TABLET ORAL DAILY
Status: DISCONTINUED | OUTPATIENT
Start: 2021-07-23 | End: 2021-07-22 | Stop reason: HOSPADM

## 2021-07-22 RX ORDER — METOPROLOL SUCCINATE 25 MG/1
25 TABLET, EXTENDED RELEASE ORAL DAILY
Qty: 30 TABLET | Refills: 11 | Status: SHIPPED | OUTPATIENT
Start: 2021-07-22 | End: 2021-08-04

## 2021-07-22 RX ORDER — DIPHENHYDRAMINE HCL 50 MG
50 CAPSULE ORAL EVERY 6 HOURS PRN
COMMUNITY

## 2021-07-22 RX ORDER — SODIUM CHLORIDE 0.9 % (FLUSH) 0.9 %
10 SYRINGE (ML) INJECTION AS NEEDED
Status: DISCONTINUED | OUTPATIENT
Start: 2021-07-22 | End: 2021-07-22 | Stop reason: HOSPADM

## 2021-07-22 RX ORDER — MIDAZOLAM HYDROCHLORIDE 1 MG/ML
INJECTION INTRAMUSCULAR; INTRAVENOUS AS NEEDED
Status: DISCONTINUED | OUTPATIENT
Start: 2021-07-22 | End: 2021-07-22 | Stop reason: HOSPADM

## 2021-07-22 RX ORDER — AMIODARONE HYDROCHLORIDE 200 MG/1
TABLET ORAL
Qty: 90 TABLET | Refills: 2 | Status: SHIPPED | OUTPATIENT
Start: 2021-07-22 | End: 2021-09-20 | Stop reason: SDUPTHER

## 2021-07-22 RX ADMIN — SODIUM CHLORIDE 3 ML/KG/HR: 9 INJECTION, SOLUTION INTRAVENOUS at 07:31

## 2021-07-22 RX ADMIN — ASPIRIN 81 MG CHEWABLE TABLET 324 MG: 81 TABLET CHEWABLE at 07:31

## 2021-07-22 NOTE — H&P
[facility] Cardiology   [address], [address]  [place], [place], [address]    [phone]  [facility]         HISTORY & PHYSICAL NOTE    Patient Care Team:  Patient Care Team:  Alda Ayers MD as PCP - General (Internal Medicine)      Chief complaint: Dyspnea, palpitations         HPI:    Estefani Jordan is a 80 y.o. female.    Cardiac focused problem list:  CAD  Status post stenting to the LAD in 2015; EF at that time was 25%.  Nuclear stress test 9/2020 OSH with normal EF and no evidence of ischemia.  Chronic systolic heart failure  EF 25% in 2015.  EF noted to be normal at time of nuclear stress testing 9/2020.  EF 7/9/2021 per TTE 21 to 25%.  Frequent PVCs  Status post PVC ablation in 2015.  14-day cardiac monitor 6/2021: 22% PVC burden, NSVT noted with longest being 5 beats.  Hypertension  Hyperlipidemia    The patient presents today for cardiac catheterization.  She was initially evaluated in the heart valve clinic for worsening exertional dyspnea, palpitations, and dizziness.  She underwent transthoracic echocardiogram which revealed a decreased EF of 21 to 25%.  Her EF had been reportedly normal in 9/2020.  She also wore a 14-day cardiac monitor with a 22% PVC burden and NSVT was noted.  Her longest episode of NSVT was 5 beats.  She has had her metoprolol increased to 75 mg p.o. daily.  She was also started on Entresto and Lasix.  Since starting Lasix she does note some improvement in her abdominal distention and weight.  She continues to experience palpitations and less of her dyspnea.    Review of Systems:  Positive for palpitations, exertional dyspnea, dizziness  All other systems reviewed and are negative.    PFSH:  Patient Active Problem List   Diagnosis    Coronary arteriosclerosis in native artery    Chronic systolic heart failure (CMS/HCC)    Essential hypertension    Gastroesophageal reflux disease    History of depression    Hypercholesterolemia     Left bundle branch block    Obstructive sleep apnea syndrome    Ventricular premature beats    Acute systolic CHF (congestive heart failure) (CMS/HCC)    Coronary artery disease of bypass graft of native heart with stable angina pectoris (CMS/HCC)    Frequent PVCs       No current facility-administered medications on file prior to encounter.     Current Outpatient Medications on File Prior to Encounter   Medication Sig Dispense Refill    alendronate (FOSAMAX) 70 MG tablet Take 70 mg by mouth 1 (One) Time Per Week.      aspirin (Aspir-Low) 81 MG EC tablet Take 81 mg by mouth Daily.      atorvastatin (LIPITOR) 20 MG tablet Take 20 mg by mouth Daily.      cetirizine (ZyrTEC Allergy) 10 MG tablet Take 10 mg by mouth Daily.      fluticasone (FLONASE) 50 MCG/ACT nasal spray 2 sprays into the nostril(s) as directed by provider Daily.      levothyroxine (SYNTHROID, LEVOTHROID) 88 MCG tablet Take 88 mcg by mouth Daily.      metoprolol succinate XL (TOPROL-XL) 50 MG 24 hr tablet Take 1.5 tablets by mouth Daily.  0    sacubitril-valsartan (Entresto) 24-26 MG tablet Take 1 tablet by mouth 2 (Two) Times a Day. 60 tablet 3    sertraline (ZOLOFT) 50 MG tablet Take 50 mg by mouth Daily.      diphenhydrAMINE (BENADRYL) 50 MG capsule Take 50 mg by mouth Every 6 (Six) Hours As Needed for Itching.       Allergies   Allergen Reactions    Contrast Dye Rash       Social History     Socioeconomic History    Marital status:      Spouse name: Not on file    Number of children: Not on file    Years of education: Not on file    Highest education level: Not on file   Tobacco Use    Smoking status: Never Smoker    Smokeless tobacco: Never Used   Substance and Sexual Activity    Alcohol use: Not Currently    Drug use: Defer    Sexual activity: Defer     Family History   Problem Relation Age of Onset    Diabetes Mother     Hypertension Father     Hyperlipidemia Father     No Known Problems Sister     Cancer Brother     No Known Problems  Maternal Grandmother     No Known Problems Maternal Grandfather     No Known Problems Paternal Grandmother     No Known Problems Paternal Grandfather             Objective:     Vital Sign Min/Max for last 24 hours  Temp  Min: 97.4 °F (36.3 °C)  Max: 97.4 °F (36.3 °C)   BP  Min: 109/71  Max: 124/60   Pulse  Min: 61  Max: 70   No data recorded   SpO2  Min: 100 %  Max: 100 %   No data recorded    No intake or output data in the 24 hours ending 07/22/21 0820        Vitals:    07/22/21 0711   BP: 109/71   Pulse: 70   Temp:    SpO2: 100%       CONSTITUTIONAL: Well-nourished. In no acute distress.   SKIN: Warm and dry. No rashes noted  LUNGS: Normal effort. Clear to auscultation bilaterally without wheezing, rhonchi, or rales noted.   CARDIOVASCULAR: The heart has a regular rate and rhythm with a normal S1 and S2. There is no murmur, gallop, rub, or click appreciated.   PERIPHERAL VASCULAR: Radial pulses are 2+ bilaterally. Posterior tibial and dorsalis pedis pulses are 2+ and symmetrical. There is no lower extremity edema.  Bilateral Barbeau type A.  PSYCHIATRIC: Alert, orientated x 3, appropriate affect and mood    Labs, results reviewed by myself:  No results found for: CKTOTAL, CKMB, CKMBINDEX, TROPONINI, TROPONINT    Glucose   Date Value Ref Range Status   07/22/2021 183 (H) 65 - 99 mg/dL Final     BUN   Date Value Ref Range Status   07/22/2021 29 (H) 8 - 23 mg/dL Final     Creatinine   Date Value Ref Range Status   07/22/2021 0.88 0.57 - 1.00 mg/dL Final     Sodium   Date Value Ref Range Status   07/22/2021 139 136 - 145 mmol/L Final     Potassium   Date Value Ref Range Status   07/22/2021 5.0 3.5 - 5.2 mmol/L Final     Comment:     Slight hemolysis detected by analyzer. Results may be affected.     Chloride   Date Value Ref Range Status   07/22/2021 103 98 - 107 mmol/L Final     CO2   Date Value Ref Range Status   07/22/2021 24.0 22.0 - 29.0 mmol/L Final     Calcium   Date Value Ref Range Status   07/22/2021 9.3 8.6  - 10.5 mg/dL Final     Total Protein   Date Value Ref Range Status   07/19/2021 7.1 6.0 - 8.5 g/dL Final     Albumin   Date Value Ref Range Status   07/19/2021 4.30 3.50 - 5.20 g/dL Final     ALT (SGPT)   Date Value Ref Range Status   07/19/2021 22 1 - 33 U/L Final     AST (SGOT)   Date Value Ref Range Status   07/19/2021 23 1 - 32 U/L Final     Alkaline Phosphatase   Date Value Ref Range Status   07/19/2021 88 39 - 117 U/L Final     Total Bilirubin   Date Value Ref Range Status   07/19/2021 0.4 0.0 - 1.2 mg/dL Final     eGFR Non  Amer   Date Value Ref Range Status   07/22/2021 62 >60 mL/min/1.73 Final     BUN/Creatinine Ratio   Date Value Ref Range Status   07/22/2021 33.0 (H) 7.0 - 25.0 Final     Anion Gap   Date Value Ref Range Status   07/22/2021 12.0 5.0 - 15.0 mmol/L Final       Lab Results   Component Value Date    CHOL 144 07/22/2021     Lab Results   Component Value Date    TRIG 49 07/22/2021     Lab Results   Component Value Date    HDL 61 (H) 07/22/2021     Lab Results   Component Value Date    LDL 72 07/22/2021     No components found for: LDLDIRECTC      WBC   Date Value Ref Range Status   07/22/2021 7.15 3.40 - 10.80 10*3/mm3 Final     RBC   Date Value Ref Range Status   07/22/2021 4.99 3.77 - 5.28 10*6/mm3 Final     Hemoglobin   Date Value Ref Range Status   07/22/2021 15.5 12.0 - 15.9 g/dL Final     Hematocrit   Date Value Ref Range Status   07/22/2021 47.5 (H) 34.0 - 46.6 % Final     MCV   Date Value Ref Range Status   07/22/2021 95.2 79.0 - 97.0 fL Final     MCH   Date Value Ref Range Status   07/22/2021 31.1 26.6 - 33.0 pg Final     MCHC   Date Value Ref Range Status   07/22/2021 32.6 31.5 - 35.7 g/dL Final     RDW   Date Value Ref Range Status   07/22/2021 13.2 12.3 - 15.4 % Final     RDW-SD   Date Value Ref Range Status   07/22/2021 46.1 37.0 - 54.0 fl Final     MPV   Date Value Ref Range Status   07/22/2021 10.5 6.0 - 12.0 fL Final     Platelets   Date Value Ref Range Status   07/22/2021  218 140 - 450 10*3/mm3 Final         Diagnostic Data:    EKG: Normal sinus rhythm with 1 AVB, occasional PVCs, LBBB    Results for orders placed during the hospital encounter of 07/09/21    Adult Transthoracic Echo Complete W/ Cont if Necessary Per Protocol    Interpretation Summary  · Left ventricular ejection fraction appears to be 21 - 25%. Left ventricular systolic function is severely decreased.  · The left ventricular cavity is moderately dilated.  · Left ventricular wall thickness is consistent with borderline concentric hypertrophy.  · There is left ventricular global hypokinesis noted.  · Left ventricular diastolic function is consistent with (grade II w/high LAP) pseudonormalization.  · Moderate to severe mitral valve regurgitation is present.      Tele: Normal sinus rhythm with frequent PVCs, brief NSVT         Assessment and Plan:   ASSESSMENT:  CAD  Status post stenting to the LAD in approximately 2015 EF at that time was 25%.  Nuclear stress test 9/2020 OSH with normal EF and no evidence of ischemia.  Chronic systolic heart failure  EF 25% in 2015.  EF noted to be normal at time of nuclear stress testing 9/2020.  EF 7/9/2021 per TTE 21 to 25%.  Frequent PVCs  Status post PVC ablation in 2015.  14-day cardiac monitor 6/2021: 22% PVC burden, NSVT noted with longest being 5 beats.  Hypertension  Hyperlipidemia    PLAN:  LHC +/-PCI with Dr. Wright. The risks, benefits, and alternatives of the procedure have been reviewed and the patient wishes to proceed.       Electronically signed by ROSALBA Novoa, 07/22/21, 8:20 AM EDT.

## 2021-07-22 NOTE — POST-PROCEDURE NOTE
Cardiology update  -Heart cath without new flow-limiting stenosis  -EF 20 to 25%, at least moderate MR, on left ventriculogram  -Starting Farxiga 5 mg daily  -Continue Entresto  -We will try to provide samples of both of those medications and see if the patient qualifies for patient assistance due to its high cost  -Not starting spironolactone due to borderline hyperkalemia  -Continue LifeVest  -Discussed PVCs, left bundle, low EF with Dr. Sánchez  -Decreasing Toprol-XL to 25 mg daily, starting amiodarone (200 mg 3 times a day for 2 weeks followed by 200 mg daily)  -Repeat EKG on Monday at local PCP office.  EKG requisition form provided  -Follow-up with Dr. Dr Wright as previously scheduled in 2 weeks on August 4 and with Dr. Sánchez in 4 weeks (will send a note to Ana Davies to help with scheduling)      Yobany Wright MD, MSc, FACC, [facility]  Interventional Cardiology  [facility]

## 2021-07-23 ENCOUNTER — DOCUMENTATION (OUTPATIENT)
Dept: CARDIAC REHAB | Facility: HOSPITAL | Age: 81
End: 2021-07-23

## 2021-08-04 ENCOUNTER — OFFICE VISIT (OUTPATIENT)
Dept: CARDIOLOGY | Facility: CLINIC | Age: 81
End: 2021-08-04

## 2021-08-04 VITALS
SYSTOLIC BLOOD PRESSURE: 118 MMHG | HEART RATE: 55 BPM | DIASTOLIC BLOOD PRESSURE: 70 MMHG | WEIGHT: 146 LBS | BODY MASS INDEX: 27.56 KG/M2 | HEIGHT: 61 IN

## 2021-08-04 DIAGNOSIS — I49.3 FREQUENT PVCS: Primary | ICD-10-CM

## 2021-08-04 DIAGNOSIS — I25.118 CORONARY ARTERY DISEASE OF NATIVE ARTERY OF NATIVE HEART WITH STABLE ANGINA PECTORIS (HCC): ICD-10-CM

## 2021-08-04 DIAGNOSIS — I10 ESSENTIAL HYPERTENSION: ICD-10-CM

## 2021-08-04 DIAGNOSIS — I44.7 LEFT BUNDLE BRANCH BLOCK: ICD-10-CM

## 2021-08-04 DIAGNOSIS — I50.22 CHRONIC SYSTOLIC HEART FAILURE (HCC): ICD-10-CM

## 2021-08-04 PROCEDURE — 99214 OFFICE O/P EST MOD 30 MIN: CPT | Performed by: INTERNAL MEDICINE

## 2021-08-04 PROCEDURE — 93000 ELECTROCARDIOGRAM COMPLETE: CPT | Performed by: INTERNAL MEDICINE

## 2021-08-04 RX ORDER — METOPROLOL SUCCINATE 25 MG/1
12.5 TABLET, EXTENDED RELEASE ORAL DAILY
Qty: 45 TABLET | Refills: 1 | Status: SHIPPED | OUTPATIENT
Start: 2021-08-04 | End: 2022-09-09 | Stop reason: SDUPTHER

## 2021-08-04 NOTE — PROGRESS NOTES
[facility] Cardiology   [address], [address]  [place], [place], [address]    [phone]  [facility]           OUTPATIENT CLINIC FOLLOW UP NOTE    Patient Care Team:  Patient Care Team:  Alda Ayers MD as PCP - General (Internal Medicine)    Subjective:      Chief Complaint   Patient presents with   • Chest Pain   • Dizziness   • Shortness of Breath   • Edema       HPI:    Estefani Jordan is a 80 y.o. female.  Problem list:  1. CAD  a. Status post stenting to the LAD in 2015; EF at that time was 25%.  b. Nuclear stress test 9/2020 OSH with normal EF and no evidence of ischemia.  c. LHC 7/22/2021: No hemodynamically significant, flow-limiting stenoses  2. Chronic systolic heart failure  a. EF 25% in 2015.  b. EF noted to be normal at time of nuclear stress testing 9/2020.  c. EF 7/9/2021 per TTE 21 to 25%.  3. Frequent PVCs  a. Status post PVC ablation in 2015.  b. 14-day cardiac monitor 6/2021: 22% PVC burden, NSVT noted with longest being 5 beats.  4. Hypertension  5. Hyperlipidemia    She presents today for follow-up.    Since the patient underwent cardiac catheterization she has not noticed any significant improvement in her symptoms.  She continues to have achy chest pain and occasional sharp stabbing pains.  She also continues to have palpitations, particularly at night.  She denies dyspnea at rest, but does have dyspnea with exertion.  Has been wearing her LifeVest.  Tolerating her medications.    Review of Systems:  Positive for chest pain, palpitations, dyspnea with exertion  Negative for exertional chest pain,  lower extremity edema, syncope.     PFSH:  Patient Active Problem List   Diagnosis   • Coronary arteriosclerosis in native artery   • Chronic systolic heart failure (CMS/HCC)   • Essential hypertension   • Gastroesophageal reflux disease   • History of depression   • Hypercholesterolemia   • Left bundle branch block   • Obstructive sleep apnea  syndrome   • Ventricular premature beats   • Acute systolic CHF (congestive heart failure) (CMS/HCC)   • Coronary artery disease of bypass graft of native heart with stable angina pectoris (CMS/HCC)   • Frequent PVCs         Current Outpatient Medications:   •  alendronate (FOSAMAX) 70 MG tablet, Take 70 mg by mouth 1 (One) Time Per Week., Disp: , Rfl:   •  amiodarone (PACERONE) 200 MG tablet, Take one tablet (200mg) three times a day, about six hours apart. Do this for 2 weeks. Then decrease dose to one tablet daily., Disp: 90 tablet, Rfl: 2  •  aspirin (Aspir-Low) 81 MG EC tablet, Take 81 mg by mouth Daily., Disp: , Rfl:   •  atorvastatin (LIPITOR) 20 MG tablet, Take 20 mg by mouth Daily., Disp: , Rfl:   •  cetirizine (ZyrTEC Allergy) 10 MG tablet, Take 10 mg by mouth Daily., Disp: , Rfl:   •  dapagliflozin (Farxiga) 5 MG tablet tablet, Take 1 tablet by mouth Daily., Disp: 30 tablet, Rfl: 5  •  diphenhydrAMINE (BENADRYL) 50 MG capsule, Take 50 mg by mouth Every 6 (Six) Hours As Needed for Itching., Disp: , Rfl:   •  fluticasone (FLONASE) 50 MCG/ACT nasal spray, 2 sprays into the nostril(s) as directed by provider Daily., Disp: , Rfl:   •  furosemide (LASIX) 20 MG tablet, Take 2 tablets by mouth Daily. For 3 days and then reduce to 20mg daily, Disp: 60 tablet, Rfl: 0  •  levothyroxine (SYNTHROID, LEVOTHROID) 88 MCG tablet, Take 88 mcg by mouth Daily., Disp: , Rfl:   •  metoprolol succinate XL (TOPROL-XL) 25 MG 24 hr tablet, Take 0.5 tablets by mouth Daily., Disp: 45 tablet, Rfl: 1  •  montelukast (SINGULAIR) 10 MG tablet, Take 10 mg by mouth every night at bedtime., Disp: , Rfl:   •  potassium chloride 10 MEQ CR tablet, Take 1 tablet by mouth Daily. Only for 3 days with 40mg of lasix and then stop., Disp: 30 tablet, Rfl: 0  •  sacubitril-valsartan (Entresto) 24-26 MG tablet, Take 1 tablet by mouth 2 (Two) Times a Day., Disp: 60 tablet, Rfl: 3  •  sertraline (ZOLOFT) 50 MG tablet, Take 50 mg by mouth Daily., Disp: ,  "Rfl:     Allergies   Allergen Reactions   • Contrast Dye Rash        reports that she has never smoked. She has never used smokeless tobacco.      Objective:   Physical exam:  /70 (BP Location: Left arm, Patient Position: Sitting)   Pulse 55   Ht 154.9 cm (61\")   Wt 66.2 kg (146 lb)   BMI 27.59 kg/m²   CONSTITUTIONAL: No acute distress  RESPIRATORY: Normal effort. Clear to auscultation bilaterally without wheezing or rales  CARDIOVASCULAR: Carotids with normal upstrokes without bruits.  Regular rate and rhythm with normal S1 and S2. Without murmur, gallop or rub. Normal left radial pulse.  Left radial access site C/D/I.  There is no lower extremity edema bilaterally.    Labs:    BUN   Date Value Ref Range Status   07/22/2021 29 (H) 8 - 23 mg/dL Final     Creatinine   Date Value Ref Range Status   07/22/2021 0.88 0.57 - 1.00 mg/dL Final     Potassium   Date Value Ref Range Status   07/22/2021 5.0 3.5 - 5.2 mmol/L Final     Comment:     Slight hemolysis detected by analyzer. Results may be affected.     ALT (SGPT)   Date Value Ref Range Status   07/19/2021 22 1 - 33 U/L Final     AST (SGOT)   Date Value Ref Range Status   07/19/2021 23 1 - 32 U/L Final       Lab Results   Component Value Date    CHOL 144 07/22/2021     Lab Results   Component Value Date    TRIG 49 07/22/2021     Lab Results   Component Value Date    HDL 61 (H) 07/22/2021     Lab Results   Component Value Date    LDL 72 07/22/2021     No components found for: LDLDIRECTC    Diagnostic Data:      ECG 12 Lead    Date/Time: 8/4/2021 2:53 PM  Performed by: Yobany Wright MD  Authorized by: Yobany Wright MD   Comparison: compared with previous ECG from 7/19/2021  Comparison to previous ECG: PVCs no longer present.  Rhythm: sinus bradycardia  Rate: bradycardic  BPM: 55  Conduction: left bundle branch block and 1st degree AV block  Comments:  ms   ms            Results for orders placed during the hospital encounter of " 07/09/21    Adult Transthoracic Echo Complete W/ Cont if Necessary Per Protocol    Interpretation Summary  · Left ventricular ejection fraction appears to be 21 - 25%. Left ventricular systolic function is severely decreased.  · The left ventricular cavity is moderately dilated.  · Left ventricular wall thickness is consistent with borderline concentric hypertrophy.  · There is left ventricular global hypokinesis noted.  · Left ventricular diastolic function is consistent with (grade II w/high LAP) pseudonormalization.  · Moderate to severe mitral valve regurgitation is present.    [facility] 7/22/2021  · There are no hemodynamically significant, flow-limiting stenoses.  The previously placed stent in the LAD is widely patent.  · Severely decreased left ventricular ejection fraction 20 to 25% with global hypokinesis, a dilated left ventricle, and at least moderate mitral regurgitation.  Elevated LVEDP of 20 mmHg    Holter monitor 6/7/2021  · PVC burden 22%. The 6 patient events were associated with PVCs. NSVT present, longest only 5 beats.  · Findings already discussed with the ordering provider, ROSALBA Guidry.      Assessment and Plan:   Diagnoses and all orders for this visit:    Frequent PVCs   Left bundle branch block  Relative bradycardia  -Prior PVC ablation in 2015.  -Continue amiodarone  -Decrease metoprolol to 12.5 mg  -Possible BiV ICD implant as discussed below  -Patient to keep scheduled follow-up with Dr. Sánchez.    Chronic systolic heart failure   Mitral regurgitation  -Continue LifeVest.  -Repeat limited transthoracic echocardiogram no sooner than October 8, 2021, to reevaluate EF with Lumason contrast as well as to reevaluate mitral regurgitation.  -Continue Entresto, beta-blocker, Farxiga  -Blood pressure currently too low to add spironolactone  -Discussed a biventricular ICD with the patient today.  Patient's  has a BiV ICD.  Could consider a consult and treat for the procedure if  needed    Coronary artery of native artery of native heart with stable angina pectoris   -No flow-limiting stenoses noted on cardiac catheterization 7/22/2021  -Continue current related medications.    Essential hypertension  -Continue current related medications.      - Return in about 6 months (around 2/4/2022) for Next scheduled follow-up with an ECG.    Scribed for Yobany Wright MD by ROSALBA Chopra 8/4/2021  15:05 EDT    I, Yobany Wright MD, personally performed the services as scribed by the above named individual. I have made any necessary edits and it is both accurate and complete.     Yobany Wright MD, MSc, FACC, [facility]  Interventional Cardiology  [facility]

## 2021-08-09 ENCOUNTER — DOCUMENTATION (OUTPATIENT)
Dept: CARDIAC REHAB | Facility: HOSPITAL | Age: 81
End: 2021-08-09

## 2021-08-09 NOTE — PROGRESS NOTES
Cardiac Rehab staff mailed referral letter to patient regarding Phase II Cardiac Rehab program. Instruction for patient to contact [facility] Cardiac Rehab Department for additional program information and to forward referral to closest facility.

## 2021-08-18 ENCOUNTER — TELEPHONE (OUTPATIENT)
Dept: CARDIOLOGY | Facility: HOSPITAL | Age: 81
End: 2021-08-18

## 2021-08-18 NOTE — TELEPHONE ENCOUNTER
Needs refill of furosemide 40 mg daily and potassium chloride 10 mg nightly sent to [facility]

## 2021-08-19 DIAGNOSIS — I50.22 CHRONIC SYSTOLIC HEART FAILURE (HCC): Primary | ICD-10-CM

## 2021-08-19 RX ORDER — FUROSEMIDE 20 MG/1
40 TABLET ORAL DAILY
Qty: 60 TABLET | Refills: 0
Start: 2021-08-19 | End: 2021-08-20 | Stop reason: SDUPTHER

## 2021-08-19 RX ORDER — POTASSIUM CHLORIDE 750 MG/1
10 TABLET, FILM COATED, EXTENDED RELEASE ORAL DAILY
Qty: 30 TABLET | Refills: 0
Start: 2021-08-19 | End: 2021-08-19

## 2021-08-19 RX ORDER — POTASSIUM CHLORIDE 750 MG/1
10 TABLET, FILM COATED, EXTENDED RELEASE ORAL DAILY
Qty: 30 TABLET | Refills: 0
Start: 2021-08-19 | End: 2022-03-17 | Stop reason: ALTCHOICE

## 2021-08-19 RX ORDER — FUROSEMIDE 20 MG/1
40 TABLET ORAL DAILY
Qty: 60 TABLET | Refills: 0
Start: 2021-08-19 | End: 2021-08-19

## 2021-08-19 NOTE — TELEPHONE ENCOUNTER
This patient saw Janice on 7/19/21, and I can't refill potassium. Would you look at this and refill her furosemide and potassium. She just saw Dr. Wright so we could probably fill for a month and then let Dr. Wright refill after that.   Thank you.

## 2021-08-20 ENCOUNTER — TELEPHONE (OUTPATIENT)
Dept: CARDIOLOGY | Facility: CLINIC | Age: 81
End: 2021-08-20

## 2021-08-20 DIAGNOSIS — I50.22 CHRONIC SYSTOLIC HEART FAILURE (HCC): ICD-10-CM

## 2021-08-20 RX ORDER — FUROSEMIDE 20 MG/1
20 TABLET ORAL DAILY
Qty: 60 TABLET | Refills: 0 | Status: SHIPPED | OUTPATIENT
Start: 2021-08-20 | End: 2021-10-15 | Stop reason: SDUPTHER

## 2021-08-23 DIAGNOSIS — I50.22 CHRONIC SYSTOLIC HEART FAILURE (HCC): ICD-10-CM

## 2021-08-23 NOTE — TELEPHONE ENCOUNTER
You sent in a month refill for this patient last week. She has already been instructed to contact Dr. Wright's office for further refills.

## 2021-08-24 RX ORDER — FUROSEMIDE 20 MG/1
TABLET ORAL
Qty: 60 TABLET | Refills: 0 | OUTPATIENT
Start: 2021-08-24

## 2021-09-20 RX ORDER — AMIODARONE HYDROCHLORIDE 200 MG/1
200 TABLET ORAL DAILY
Qty: 90 TABLET | Refills: 0 | Status: SHIPPED | OUTPATIENT
Start: 2021-09-20 | End: 2021-11-03

## 2021-10-05 RX ORDER — DAPAGLIFLOZIN 5 MG/1
5 TABLET, FILM COATED ORAL DAILY
Qty: 90 TABLET | Refills: 3 | Status: ON HOLD | OUTPATIENT
Start: 2021-10-05 | End: 2021-12-07

## 2021-10-11 ENCOUNTER — HOSPITAL ENCOUNTER (OUTPATIENT)
Dept: CARDIOLOGY | Facility: HOSPITAL | Age: 81
Discharge: HOME | End: 2021-10-11
Admitting: NURSE PRACTITIONER

## 2021-10-11 VITALS — BODY MASS INDEX: 29.43 KG/M2 | HEIGHT: 59 IN | WEIGHT: 146 LBS

## 2021-10-11 DIAGNOSIS — I50.22 CHRONIC SYSTOLIC HEART FAILURE (HCC): ICD-10-CM

## 2021-10-11 PROCEDURE — 93321 DOPPLER ECHO F-UP/LMTD STD: CPT | Performed by: INTERNAL MEDICINE

## 2021-10-11 PROCEDURE — 93308 TTE F-UP OR LMTD: CPT | Performed by: INTERNAL MEDICINE

## 2021-10-11 PROCEDURE — 93325 DOPPLER ECHO COLOR FLOW MAPG: CPT

## 2021-10-11 PROCEDURE — 93308 TTE F-UP OR LMTD: CPT

## 2021-10-11 PROCEDURE — 93321 DOPPLER ECHO F-UP/LMTD STD: CPT

## 2021-10-11 PROCEDURE — 93325 DOPPLER ECHO COLOR FLOW MAPG: CPT | Performed by: INTERNAL MEDICINE

## 2021-10-11 PROCEDURE — 25010000002 SULFUR HEXAFLUORIDE MICROSPH 60.7-25 MG RECONSTITUTED SUSPENSION: Performed by: NURSE PRACTITIONER

## 2021-10-11 RX ADMIN — SULFUR HEXAFLUORIDE 1 ML: KIT at 13:49

## 2021-10-12 ENCOUNTER — TELEPHONE (OUTPATIENT)
Dept: CARDIOLOGY | Facility: CLINIC | Age: 81
End: 2021-10-12

## 2021-10-12 LAB
BH CV ECHO MEAS - BSA(HAYCOCK): 1.7 M^2
BH CV ECHO MEAS - BSA: 1.7 M^2
BH CV ECHO MEAS - BZI_BMI: 27.6 KILOGRAMS/M^2
BH CV ECHO MEAS - BZI_METRIC_HEIGHT: 154.9 CM
BH CV ECHO MEAS - BZI_METRIC_WEIGHT: 66.2 KG
BH CV ECHO MEAS - EDV(CUBED): 235.1 ML
BH CV ECHO MEAS - EDV(MOD-SP2): 127 ML
BH CV ECHO MEAS - EDV(MOD-SP4): 204 ML
BH CV ECHO MEAS - EDV(TEICH): 192 ML
BH CV ECHO MEAS - EF(CUBED): 62.3 %
BH CV ECHO MEAS - EF(MOD-BP): 37 %
BH CV ECHO MEAS - EF(MOD-SP2): 29.9 %
BH CV ECHO MEAS - EF(MOD-SP4): 43.6 %
BH CV ECHO MEAS - EF(TEICH): 52.9 %
BH CV ECHO MEAS - ESV(CUBED): 88.7 ML
BH CV ECHO MEAS - ESV(MOD-SP2): 89 ML
BH CV ECHO MEAS - ESV(MOD-SP4): 115 ML
BH CV ECHO MEAS - ESV(TEICH): 90.5 ML
BH CV ECHO MEAS - FS: 27.7 %
BH CV ECHO MEAS - IVS/LVPW: 0.86
BH CV ECHO MEAS - IVSD: 0.77 CM
BH CV ECHO MEAS - LV DIASTOLIC VOL/BSA (35-75): 123.4 ML/M^2
BH CV ECHO MEAS - LV MASS(C)D: 204.9 GRAMS
BH CV ECHO MEAS - LV MASS(C)DI: 124 GRAMS/M^2
BH CV ECHO MEAS - LV SYSTOLIC VOL/BSA (12-30): 69.6 ML/M^2
BH CV ECHO MEAS - LVIDD: 6.2 CM
BH CV ECHO MEAS - LVIDS: 4.5 CM
BH CV ECHO MEAS - LVLD AP2: 8.2 CM
BH CV ECHO MEAS - LVLD AP4: 8.6 CM
BH CV ECHO MEAS - LVLS AP2: 7.9 CM
BH CV ECHO MEAS - LVLS AP4: 7.4 CM
BH CV ECHO MEAS - LVPWD: 0.89 CM
BH CV ECHO MEAS - MR ALIAS VEL: 61.6 CM/SEC
BH CV ECHO MEAS - MR ERO: 0.18 CM^2
BH CV ECHO MEAS - MR FLOW RATE: 95 CM^3/SEC
BH CV ECHO MEAS - MR MAX PG: 112 MMHG
BH CV ECHO MEAS - MR MAX VEL: 529.4 CM/SEC
BH CV ECHO MEAS - MR MEAN PG: 63.7 MMHG
BH CV ECHO MEAS - MR MEAN VEL: 364.4 CM/SEC
BH CV ECHO MEAS - MR PISA RADIUS: 0.5 CM
BH CV ECHO MEAS - MR PISA: 1.5 CM^2
BH CV ECHO MEAS - MR VOLUME: 45.1 ML
BH CV ECHO MEAS - MR VTI: 251.5 CM
BH CV ECHO MEAS - MV AREA (1 DIAM): 6.3 CM^2
BH CV ECHO MEAS - MV DEC SLOPE: 286 CM/SEC^2
BH CV ECHO MEAS - MV DIAM: 2.8 CM
BH CV ECHO MEAS - MV FLOW AREA(1DIAM): 6.3 CM^2
BH CV ECHO MEAS - MV MAX PG: 4.5 MMHG
BH CV ECHO MEAS - MV MEAN PG: 1.9 MMHG
BH CV ECHO MEAS - MV P1/2T MAX VEL: 65.2 CM/SEC
BH CV ECHO MEAS - MV P1/2T: 66.7 MSEC
BH CV ECHO MEAS - MV V2 MAX: 105.9 CM/SEC
BH CV ECHO MEAS - MV V2 MEAN: 64 CM/SEC
BH CV ECHO MEAS - MV V2 VTI: 27.5 CM
BH CV ECHO MEAS - MVA P1/2T LCG: 3.4 CM^2
BH CV ECHO MEAS - MVA(P1/2T): 3.3 CM^2
BH CV ECHO MEAS - SI(CUBED): 88.6 ML/M^2
BH CV ECHO MEAS - SI(MOD-SP2): 23 ML/M^2
BH CV ECHO MEAS - SI(MOD-SP4): 53.9 ML/M^2
BH CV ECHO MEAS - SI(MV 1 DIAM): 104.3 ML/M^2
BH CV ECHO MEAS - SI(TEICH): 61.4 ML/M^2
BH CV ECHO MEAS - SV(CUBED): 146.4 ML
BH CV ECHO MEAS - SV(MOD-SP2): 38 ML
BH CV ECHO MEAS - SV(MOD-SP4): 89 ML
BH CV ECHO MEAS - SV(MV 1 DIAM): 172.4 ML
BH CV ECHO MEAS - SV(TEICH): 101.5 ML
BH CV VAS BP RIGHT ARM: NORMAL MMHG

## 2021-10-12 NOTE — TELEPHONE ENCOUNTER
Spoke with patient regarding echo results and recommendations. Patient is agreeable to consult and treat for BiV ICD. Will have EP reach out to schedule.

## 2021-10-15 DIAGNOSIS — I50.22 CHRONIC SYSTOLIC HEART FAILURE (HCC): ICD-10-CM

## 2021-10-15 RX ORDER — FUROSEMIDE 20 MG/1
20 TABLET ORAL DAILY
Qty: 90 TABLET | Refills: 0 | Status: SHIPPED | OUTPATIENT
Start: 2021-10-15 | End: 2022-01-07

## 2021-11-01 NOTE — PROGRESS NOTES
Electrophysiology Clinic Consult     Estefani Jordan  1940  [unfilled]  [unfilled]    11/03/21    DATE OF ADMISSION: (Not on file)  [facility] CARDIOLOGY    Alda Ayers MD  [address] [id number]  Referring Provider: Yobany Wright MD     Chief Complaint   Patient presents with   • PVC's (premature ventricular contractions)     Referring: Dr. Wright    CC: PVC, nonischemic cardiomyopathy    Problem list:    1. Frequent PVCs  1. Status post PVC ablation 2015 with Dr. Caro in [facility].   2. 14-day monitor 6/20/2021: 22% PVC burden, nonsustained ventricular tachycardia noted-longest run 5 beats  2. CAD  a. Status post stenting to the LAD in 2015; EF at that time was 25%.  b. Nuclear stress test 9/2020 OSH no evidence of ischemia.  c. LHC 7/22/2021: LVEf 25%, mod MR No hemodynamically significant, flow-limiting stenoses  3. Chronic systolic heart failure  a. EF 25% in 2015.  b. EF noted to be normal at time of nuclear stress testing 9/2020.  c. EF 7/9/2021 per TTE 21 to 25%.  d. August 2021 echocardiogram EF 31-35%.  Following segments are hypokinetic: Mid anterior, basal inferior septal, mid inferior septal, mid anterior septal and basal inferior septal.  Moderately dilated LV.  Mild to moderate MR  4. LBBB  5. Hypertension  6. Hyperlipidemia    History of Present Illness:     Mrs. Jordan is a pleasant 80-year-old  female referred by Dr. Wright for consultation regarding premature ventricular contractions. Patient has had PVCs for several years and is s/p PVC RFA in 2015 with Dr. Caro in [facility]. She did well following her procedure until about 6 months ago. She denies any lifestyle changes or covid illness that would provoke her PVCs. Patient is moderately symptomatic with palps, fluttering, racing heart beat that goes into her throat. She also has mild CP and SOB intermittently. Patient wore a 14 day monitor in June 2021  that showed 22% PVC burden along with brief nonsustained ventricular tachycardia. Patient was initiated on amiodarone 200 mg daily in July without any improvement of symptoms. She had a  Normal stress in July, but was noted to have a low EF of 20-25% with initiation of Entresto and repeat Echo in August with persistently low EF 31-35% and has been wearing a Life Vest since this time. She denies any shocks. Denies any recent hospital visits, ER visits, bleeding, TIA or CVA symptoms. Bp well controlled. Overall patient feeling poorly and more fatigued. She does have a family hx of CAD.  + VALENZUELA with minimal exertion    Caffeine-1-2 cups coffee day  Tobacco-none  Alcohol-none  Takes cetirizine daily   Illicit drug use- none  DAVINA - none   Hypothyroidism controlled on synthroid    Allergies   Allergen Reactions   • Contrast Dye Rash        Cannot display prior to admission medications because the patient has not been admitted in this contact.            Current Outpatient Medications:   •  alendronate (FOSAMAX) 70 MG tablet, Take 70 mg by mouth 1 (One) Time Per Week., Disp: , Rfl:   •  amiodarone (PACERONE) 200 MG tablet, Take 1 tablet by mouth Daily., Disp: 90 tablet, Rfl: 0  •  aspirin (Aspir-Low) 81 MG EC tablet, Take 81 mg by mouth Daily., Disp: , Rfl:   •  atorvastatin (LIPITOR) 20 MG tablet, Take 20 mg by mouth Daily., Disp: , Rfl:   •  cetirizine (ZyrTEC Allergy) 10 MG tablet, Take 10 mg by mouth Daily., Disp: , Rfl:   •  dapagliflozin (Farxiga) 5 MG tablet tablet, Take 1 tablet by mouth Daily., Disp: 90 tablet, Rfl: 3  •  diphenhydrAMINE (BENADRYL) 50 MG capsule, Take 50 mg by mouth Every 6 (Six) Hours As Needed for Itching., Disp: , Rfl:   •  fluticasone (FLONASE) 50 MCG/ACT nasal spray, 2 sprays into the nostril(s) as directed by provider Daily., Disp: , Rfl:   •  furosemide (LASIX) 20 MG tablet, Take 1 tablet by mouth Daily., Disp: 90 tablet, Rfl: 0  •  levothyroxine (SYNTHROID, LEVOTHROID) 88 MCG tablet, Take  88 mcg by mouth Daily., Disp: , Rfl:   •  metoprolol succinate XL (TOPROL-XL) 25 MG 24 hr tablet, Take 0.5 tablets by mouth Daily., Disp: 45 tablet, Rfl: 1  •  montelukast (SINGULAIR) 10 MG tablet, Take 10 mg by mouth every night at bedtime., Disp: , Rfl:   •  potassium chloride 10 MEQ CR tablet, Take 1 tablet by mouth Daily. Only for 3 days with 40mg of lasix and then stop., Disp: 30 tablet, Rfl: 0  •  sacubitril-valsartan (Entresto) 24-26 MG tablet, Take 1 tablet by mouth 2 (Two) Times a Day., Disp: 60 tablet, Rfl: 3  •  sertraline (ZOLOFT) 50 MG tablet, Take 50 mg by mouth Daily., Disp: , Rfl:     Social History     Socioeconomic History   • Marital status:    Tobacco Use   • Smoking status: Never Smoker   • Smokeless tobacco: Never Used   Substance and Sexual Activity   • Alcohol use: Not Currently   • Drug use: Defer   • Sexual activity: Defer       Family History   Problem Relation Age of Onset   • Diabetes Mother    • Hypertension Father    • Hyperlipidemia Father    • No Known Problems Sister    • Cancer Brother    • No Known Problems Maternal Grandmother    • No Known Problems Maternal Grandfather    • No Known Problems Paternal Grandmother    • No Known Problems Paternal Grandfather        REVIEW OF SYSTEMS:   CONST:  No weight loss, fever, chills,+ weakness + fatigue. +palps + CP   HEENT:  No visual loss, blurred vision, double vision, yellow sclerae.                   No hearing loss, congestion, sore throat.   SKIN:      No rashes, urticaria, ulcers, sores.     RESP:     + shortness of breath, -hemoptysis, cough, sputum.   GI:           No anorexia, nausea, vomiting, diarrhea. No abdominal pain, melena.   :         No burning on urination, hematuria or increased frequency.  ENDO:    No diaphoresis, cold or heat intolerance. No polyuria or polydipsia.   NEURO:  No headache, dizziness, syncope, paralysis, ataxia, or parasthesias.                  No change in bowel or bladder control. No history  "of CVA/TIA  MUSC:    No muscle, back pain, joint pain or stiffness.   HEME:    No anemia, bleeding, bruising. No history of DVT/PE.  PSYCH:  No history of depression, anxiety    Vitals:    11/03/21 1013   BP: 120/72   BP Location: Right arm   Patient Position: Sitting   Pulse: 65   SpO2: 98%   Weight: 67.1 kg (148 lb)   Height: 154.9 cm (61\")                 Physical Exam:  GEN: Well nourished, well-developed, no acute distress  HEENT: Normocephalic, atraumatic, PERRLA, moist mucous membranes  NECK: Supple, NO JVD, no thyromegaly, no lymphadenopathy  CARD: S1S2, RRR, S3 present +MR murmur, PMI not appreciated  LUNGS: Clear to auscultation, normal respiratory effort  ABDOMEN: Soft, nontender, normal bowel sounds  EXTREMITIES: No gross deformities, no clubbing, cyanosis, or edema  SKIN: Warm, dry, no lesions  NEURO: No focal deficits, alert and oriented x 3  PSYCHIATRIC: Normal affect and mood      I personally viewed and interpreted the patient's EKG/Telemetry/lab data    Lab Results   Component Value Date    GLUCOSE 183 (H) 07/22/2021    CALCIUM 9.3 07/22/2021     07/22/2021    K 5.0 07/22/2021    CO2 24.0 07/22/2021     07/22/2021    BUN 29 (H) 07/22/2021    CREATININE 0.88 07/22/2021    EGFRIFNONA 62 07/22/2021    BCR 33.0 (H) 07/22/2021    ANIONGAP 12.0 07/22/2021     Lab Results   Component Value Date    WBC 7.15 07/22/2021    HGB 15.5 07/22/2021    HCT 47.5 (H) 07/22/2021    MCV 95.2 07/22/2021     07/22/2021     No results found for: INR, PROTIME  No results found for: TSH, H9KHYOB, U4HQCTR, THYROIDAB          ECG 12 Lead    Date/Time: 11/3/2021 10:19 AM  Performed by: Cortez Sánchez MD  Authorized by: Cortez Sánchez MD   Comparison: compared with previous ECG from 8/4/2021  Similar to previous ECG  Rhythm: sinus rhythm  Rate: normal  BPM: 65  Conduction: left bundle branch block and 1st degree AV block              ICD-10-CM ICD-9-CM   1. Frequent PVCs  I49.3 427.69   2. Chronic " systolic heart failure (HCC)  I50.22 428.22   3. Cardiomyopathy, dilated (HCC)  I42.0 425.4   4. Left bundle branch block  I44.7 426.3       Assessment and Plan:   1) Premature ventricular contractions  -s/p PVC RFA with Dr. Chin in 2015 with improvement of PVCs until the last 6 months. In June 2020 monitor showed 22% PVC burden along with brief nonsustained ventricular tachycardia. She is moderately symptomatic with palpitations, fatigue, sob and has been wearing a LifeVest. She has been on medical therapy including low dose metoprolol (12.5 mg daily) as well as amiodarone (initiated 7/22/2021) with continued symptoms. Dr. Sánchez discussed PVCs in detail with patient and her  along with her persistently low EF. Will consider re do PVC RFA following ICD implant. The risks, benefits, and alternatives of the procedure have been reviewed.     2) Dilated cardiomyopathy/Chronic systolic heart failure  -Follows with Dr. Wright and currently on Entresto,Toprol-XL,Farxiga, Lasix  -EF 7/9/2021 per TTE 21 to 25%.  -Echocardiogram 8/20/2021 EF 31 to 35%  -Normal Stress test in July with noted EF 21-25%, initiation of Entresto with some improvement in August to 31-35%. Given that her ejection fraction has remained persistently low despite compliance with guideline directed medical therapy she may be a candidate for a biventricular ICD. Pt is being referred to for an Implantable Cardioverter- Defibrillator (ICD). I have discussed and allowed the pt to ask questions regarding ICD.  Pt was provided with a Shared Decision ICD booklet by the APRN.   -Continue Life Vest until procedure.     3) Conduction system disease-  -First-degree AV block and left bundle branch block noted on EKG,  ms    Scribed for Cortez Sánchez MD by ROSALBA Espinoza. 11/3/2021 10:34 EDT     I, Cortez Sánchez MD, personally performed the services described in this documentation as scribed by the above named individual in my  presence, and it is both accurate and complete.  11/3/2021  10:45 EDT

## 2021-11-03 ENCOUNTER — OFFICE VISIT (OUTPATIENT)
Dept: CARDIOLOGY | Facility: CLINIC | Age: 81
End: 2021-11-03

## 2021-11-03 VITALS
HEIGHT: 61 IN | BODY MASS INDEX: 27.94 KG/M2 | OXYGEN SATURATION: 98 % | SYSTOLIC BLOOD PRESSURE: 120 MMHG | HEART RATE: 65 BPM | DIASTOLIC BLOOD PRESSURE: 72 MMHG | WEIGHT: 148 LBS

## 2021-11-03 DIAGNOSIS — I42.0 CARDIOMYOPATHY, DILATED (HCC): ICD-10-CM

## 2021-11-03 DIAGNOSIS — I50.22 CHRONIC SYSTOLIC HEART FAILURE (HCC): Primary | ICD-10-CM

## 2021-11-03 DIAGNOSIS — I49.3 FREQUENT PVCS: Primary | ICD-10-CM

## 2021-11-03 DIAGNOSIS — I50.22 CHRONIC SYSTOLIC HEART FAILURE (HCC): ICD-10-CM

## 2021-11-03 DIAGNOSIS — I44.7 LEFT BUNDLE BRANCH BLOCK: ICD-10-CM

## 2021-11-03 PROCEDURE — 93000 ELECTROCARDIOGRAM COMPLETE: CPT | Performed by: INTERNAL MEDICINE

## 2021-11-03 PROCEDURE — 99204 OFFICE O/P NEW MOD 45 MIN: CPT | Performed by: INTERNAL MEDICINE

## 2021-11-24 ENCOUNTER — PREP FOR SURGERY (OUTPATIENT)
Dept: OTHER | Facility: HOSPITAL | Age: 81
End: 2021-11-24

## 2021-11-24 DIAGNOSIS — I50.22 CHRONIC SYSTOLIC HEART FAILURE (HCC): Primary | ICD-10-CM

## 2021-11-24 DIAGNOSIS — I42.0 CARDIOMYOPATHY, DILATED (HCC): ICD-10-CM

## 2021-11-24 RX ORDER — ACETAMINOPHEN 325 MG/1
650 TABLET ORAL EVERY 4 HOURS PRN
Status: CANCELLED | OUTPATIENT
Start: 2021-11-24

## 2021-11-24 RX ORDER — SODIUM CHLORIDE 0.9 % (FLUSH) 0.9 %
10 SYRINGE (ML) INJECTION AS NEEDED
Status: CANCELLED | OUTPATIENT
Start: 2021-11-24

## 2021-11-24 RX ORDER — CEFAZOLIN SODIUM 2 G/100ML
2 INJECTION, SOLUTION INTRAVENOUS ONCE
Status: CANCELLED | OUTPATIENT
Start: 2021-11-24 | End: 2021-11-24

## 2021-11-24 RX ORDER — NITROGLYCERIN 0.4 MG/1
0.4 TABLET SUBLINGUAL
Status: CANCELLED | OUTPATIENT
Start: 2021-11-24

## 2021-11-24 RX ORDER — SODIUM CHLORIDE 9 MG/ML
1 INJECTION, SOLUTION INTRAVENOUS CONTINUOUS
Status: CANCELLED | OUTPATIENT
Start: 2021-11-24 | End: 2021-11-24

## 2021-11-24 RX ORDER — SODIUM CHLORIDE 0.9 % (FLUSH) 0.9 %
3 SYRINGE (ML) INJECTION EVERY 12 HOURS SCHEDULED
Status: CANCELLED | OUTPATIENT
Start: 2021-11-24

## 2021-12-07 ENCOUNTER — APPOINTMENT (OUTPATIENT)
Dept: GENERAL RADIOLOGY | Facility: HOSPITAL | Age: 81
End: 2021-12-07

## 2021-12-07 ENCOUNTER — HOSPITAL ENCOUNTER (OUTPATIENT)
Facility: HOSPITAL | Age: 81
Discharge: HOME | End: 2021-12-07
Attending: INTERNAL MEDICINE | Admitting: INTERNAL MEDICINE

## 2021-12-07 VITALS
WEIGHT: 147.05 LBS | DIASTOLIC BLOOD PRESSURE: 55 MMHG | BODY MASS INDEX: 27.76 KG/M2 | OXYGEN SATURATION: 94 % | RESPIRATION RATE: 12 BRPM | HEART RATE: 63 BPM | HEIGHT: 61 IN | SYSTOLIC BLOOD PRESSURE: 100 MMHG | TEMPERATURE: 98.2 F

## 2021-12-07 DIAGNOSIS — I42.0 CARDIOMYOPATHY, DILATED (HCC): ICD-10-CM

## 2021-12-07 DIAGNOSIS — I50.22 CHRONIC SYSTOLIC HEART FAILURE (HCC): ICD-10-CM

## 2021-12-07 LAB
ANION GAP SERPL CALCULATED.3IONS-SCNC: 11 MMOL/L (ref 5–15)
BASE EXCESS BLDA CALC-SCNC: 5 MMOL/L (ref -5–5)
BUN SERPL-MCNC: 23 MG/DL (ref 8–23)
BUN/CREAT SERPL: 21.5 (ref 7–25)
CA-I BLDA-SCNC: 1.06 MMOL/L (ref 1.2–1.32)
CALCIUM SPEC-SCNC: 8.7 MG/DL (ref 8.6–10.5)
CHLORIDE SERPL-SCNC: 103 MMOL/L (ref 98–107)
CO2 BLDA-SCNC: 31 MMOL/L (ref 24–29)
CO2 SERPL-SCNC: 27 MMOL/L (ref 22–29)
CREAT BLDA-MCNC: 0.9 MG/DL (ref 0.6–1.3)
CREAT SERPL-MCNC: 1.07 MG/DL (ref 0.57–1)
DEPRECATED RDW RBC AUTO: 44 FL (ref 37–54)
ERYTHROCYTE [DISTWIDTH] IN BLOOD BY AUTOMATED COUNT: 12.5 % (ref 12.3–15.4)
GFR SERPL CREATININE-BSD FRML MDRD: 50 ML/MIN/1.73
GLUCOSE BLDC GLUCOMTR-MCNC: 80 MG/DL (ref 70–130)
GLUCOSE SERPL-MCNC: 76 MG/DL (ref 65–99)
HBA1C MFR BLD: 5.9 % (ref 4.8–5.6)
HCO3 BLDA-SCNC: 29.3 MMOL/L (ref 22–26)
HCT VFR BLD AUTO: 42.6 % (ref 34–46.6)
HCT VFR BLDA CALC: 40 % (ref 38–51)
HGB BLD-MCNC: 14.2 G/DL (ref 12–15.9)
HGB BLDA-MCNC: 13.6 G/DL (ref 12–17)
MCH RBC QN AUTO: 32 PG (ref 26.6–33)
MCHC RBC AUTO-ENTMCNC: 33.3 G/DL (ref 31.5–35.7)
MCV RBC AUTO: 95.9 FL (ref 79–97)
PCO2 BLDA: 43.5 MM HG (ref 35–45)
PH BLDA: 7.44 PH UNITS (ref 7.35–7.6)
PLATELET # BLD AUTO: 162 10*3/MM3 (ref 140–450)
PMV BLD AUTO: 10.1 FL (ref 6–12)
PO2 BLDA: 33 MMHG (ref 80–105)
POTASSIUM BLDA-SCNC: 5.1 MMOL/L (ref 3.5–4.9)
POTASSIUM SERPL-SCNC: 3.5 MMOL/L (ref 3.5–5.2)
RBC # BLD AUTO: 4.44 10*6/MM3 (ref 3.77–5.28)
SAO2 % BLDA: 66 % (ref 95–98)
SODIUM BLD-SCNC: 141 MMOL/L (ref 138–146)
SODIUM SERPL-SCNC: 141 MMOL/L (ref 136–145)
WBC NRBC COR # BLD: 6.63 10*3/MM3 (ref 3.4–10.8)

## 2021-12-07 PROCEDURE — C1892 INTRO/SHEATH,FIXED,PEEL-AWAY: HCPCS | Performed by: INTERNAL MEDICINE

## 2021-12-07 PROCEDURE — C1769 GUIDE WIRE: HCPCS | Performed by: INTERNAL MEDICINE

## 2021-12-07 PROCEDURE — 25010000002 METHYLPREDNISOLONE PER 125 MG: Performed by: NURSE PRACTITIONER

## 2021-12-07 PROCEDURE — 0 LIDOCAINE 1 % SOLUTION: Performed by: INTERNAL MEDICINE

## 2021-12-07 PROCEDURE — 82947 ASSAY GLUCOSE BLOOD QUANT: CPT

## 2021-12-07 PROCEDURE — 25010000002 ONDANSETRON PER 1 MG: Performed by: INTERNAL MEDICINE

## 2021-12-07 PROCEDURE — 33249 INSJ/RPLCMT DEFIB W/LEAD(S): CPT | Performed by: INTERNAL MEDICINE

## 2021-12-07 PROCEDURE — 82565 ASSAY OF CREATININE: CPT

## 2021-12-07 PROCEDURE — C1898 LEAD, PMKR, OTHER THAN TRANS: HCPCS | Performed by: INTERNAL MEDICINE

## 2021-12-07 PROCEDURE — 85027 COMPLETE CBC AUTOMATED: CPT | Performed by: INTERNAL MEDICINE

## 2021-12-07 PROCEDURE — 99152 MOD SED SAME PHYS/QHP 5/>YRS: CPT | Performed by: INTERNAL MEDICINE

## 2021-12-07 PROCEDURE — 82803 BLOOD GASES ANY COMBINATION: CPT

## 2021-12-07 PROCEDURE — 84295 ASSAY OF SERUM SODIUM: CPT

## 2021-12-07 PROCEDURE — 71046 X-RAY EXAM CHEST 2 VIEWS: CPT

## 2021-12-07 PROCEDURE — 99153 MOD SED SAME PHYS/QHP EA: CPT | Performed by: INTERNAL MEDICINE

## 2021-12-07 PROCEDURE — 33225 L VENTRIC PACING LEAD ADD-ON: CPT | Performed by: INTERNAL MEDICINE

## 2021-12-07 PROCEDURE — 25010000002 DIPHENHYDRAMINE PER 50 MG: Performed by: NURSE PRACTITIONER

## 2021-12-07 PROCEDURE — C1887 CATHETER, GUIDING: HCPCS | Performed by: INTERNAL MEDICINE

## 2021-12-07 PROCEDURE — C1900 LEAD, CORONARY VENOUS: HCPCS | Performed by: INTERNAL MEDICINE

## 2021-12-07 PROCEDURE — C1882 AICD, OTHER THAN SING/DUAL: HCPCS | Performed by: INTERNAL MEDICINE

## 2021-12-07 PROCEDURE — C1777 LEAD, AICD, ENDO SINGLE COIL: HCPCS | Performed by: INTERNAL MEDICINE

## 2021-12-07 PROCEDURE — C1730 CATH, EP, 19 OR FEW ELECT: HCPCS | Performed by: INTERNAL MEDICINE

## 2021-12-07 PROCEDURE — 85014 HEMATOCRIT: CPT

## 2021-12-07 PROCEDURE — 83036 HEMOGLOBIN GLYCOSYLATED A1C: CPT | Performed by: INTERNAL MEDICINE

## 2021-12-07 PROCEDURE — 25010000002 FENTANYL CITRATE (PF) 50 MCG/ML SOLUTION: Performed by: INTERNAL MEDICINE

## 2021-12-07 PROCEDURE — 84132 ASSAY OF SERUM POTASSIUM: CPT

## 2021-12-07 PROCEDURE — 80048 BASIC METABOLIC PNL TOTAL CA: CPT | Performed by: INTERNAL MEDICINE

## 2021-12-07 PROCEDURE — 0 IOPAMIDOL PER 1 ML: Performed by: INTERNAL MEDICINE

## 2021-12-07 PROCEDURE — 25010000002 MIDAZOLAM PER 1 MG: Performed by: INTERNAL MEDICINE

## 2021-12-07 PROCEDURE — 0 CEFAZOLIN IN DEXTROSE 2-4 GM/100ML-% SOLUTION: Performed by: NURSE PRACTITIONER

## 2021-12-07 PROCEDURE — 82330 ASSAY OF CALCIUM: CPT

## 2021-12-07 DEVICE — LD PM TENDRIL STS 6F46CM 2088TC46: Type: IMPLANTABLE DEVICE | Status: FUNCTIONAL

## 2021-12-07 DEVICE — LD DEFIB DURATA SJ4 58CM 7122Q58: Type: IMPLANTABLE DEVICE | Status: FUNCTIONAL

## 2021-12-07 DEVICE — IMPLANTABLE DEVICE: Type: IMPLANTABLE DEVICE | Status: FUNCTIONAL

## 2021-12-07 DEVICE — LD QUARTET LV S/CRV BIPOL 1458Q/86: Type: IMPLANTABLE DEVICE | Status: FUNCTIONAL

## 2021-12-07 RX ORDER — DIPHENHYDRAMINE HYDROCHLORIDE 50 MG/ML
50 INJECTION INTRAMUSCULAR; INTRAVENOUS ONCE
Status: COMPLETED | OUTPATIENT
Start: 2021-12-07 | End: 2021-12-07

## 2021-12-07 RX ORDER — ACETAMINOPHEN 650 MG/1
650 SUPPOSITORY RECTAL EVERY 4 HOURS PRN
Status: DISCONTINUED | OUTPATIENT
Start: 2021-12-07 | End: 2021-12-07 | Stop reason: HOSPADM

## 2021-12-07 RX ORDER — MIDAZOLAM HYDROCHLORIDE 1 MG/ML
INJECTION INTRAMUSCULAR; INTRAVENOUS AS NEEDED
Status: DISCONTINUED | OUTPATIENT
Start: 2021-12-07 | End: 2021-12-07 | Stop reason: HOSPADM

## 2021-12-07 RX ORDER — CEFAZOLIN SODIUM 2 G/100ML
2 INJECTION, SOLUTION INTRAVENOUS ONCE
Status: COMPLETED | OUTPATIENT
Start: 2021-12-07 | End: 2021-12-07

## 2021-12-07 RX ORDER — SODIUM CHLORIDE 0.9 % (FLUSH) 0.9 %
10 SYRINGE (ML) INJECTION AS NEEDED
Status: DISCONTINUED | OUTPATIENT
Start: 2021-12-07 | End: 2021-12-07 | Stop reason: HOSPADM

## 2021-12-07 RX ORDER — FAMOTIDINE 10 MG/ML
20 INJECTION, SOLUTION INTRAVENOUS EVERY 12 HOURS SCHEDULED
Status: DISCONTINUED | OUTPATIENT
Start: 2021-12-07 | End: 2021-12-07 | Stop reason: HOSPADM

## 2021-12-07 RX ORDER — SODIUM CHLORIDE 0.9 % (FLUSH) 0.9 %
3 SYRINGE (ML) INJECTION EVERY 12 HOURS SCHEDULED
Status: DISCONTINUED | OUTPATIENT
Start: 2021-12-07 | End: 2021-12-07 | Stop reason: HOSPADM

## 2021-12-07 RX ORDER — ONDANSETRON 2 MG/ML
4 INJECTION INTRAMUSCULAR; INTRAVENOUS EVERY 6 HOURS PRN
Status: DISCONTINUED | OUTPATIENT
Start: 2021-12-07 | End: 2021-12-07 | Stop reason: HOSPADM

## 2021-12-07 RX ORDER — SODIUM CHLORIDE 9 MG/ML
1 INJECTION, SOLUTION INTRAVENOUS CONTINUOUS
Status: ACTIVE | OUTPATIENT
Start: 2021-12-07 | End: 2021-12-07

## 2021-12-07 RX ORDER — LIDOCAINE HYDROCHLORIDE 10 MG/ML
INJECTION, SOLUTION INFILTRATION; PERINEURAL AS NEEDED
Status: DISCONTINUED | OUTPATIENT
Start: 2021-12-07 | End: 2021-12-07 | Stop reason: HOSPADM

## 2021-12-07 RX ORDER — METHYLPREDNISOLONE SODIUM SUCCINATE 125 MG/2ML
125 INJECTION, POWDER, LYOPHILIZED, FOR SOLUTION INTRAMUSCULAR; INTRAVENOUS ONCE
Status: COMPLETED | OUTPATIENT
Start: 2021-12-07 | End: 2021-12-07

## 2021-12-07 RX ORDER — ONDANSETRON 2 MG/ML
INJECTION INTRAMUSCULAR; INTRAVENOUS AS NEEDED
Status: DISCONTINUED | OUTPATIENT
Start: 2021-12-07 | End: 2021-12-07 | Stop reason: HOSPADM

## 2021-12-07 RX ORDER — SODIUM CHLORIDE 9 MG/ML
INJECTION, SOLUTION INTRAVENOUS CONTINUOUS PRN
Status: DISCONTINUED | OUTPATIENT
Start: 2021-12-07 | End: 2021-12-07 | Stop reason: HOSPADM

## 2021-12-07 RX ORDER — ACETAMINOPHEN 325 MG/1
650 TABLET ORAL EVERY 4 HOURS PRN
Status: DISCONTINUED | OUTPATIENT
Start: 2021-12-07 | End: 2021-12-07 | Stop reason: HOSPADM

## 2021-12-07 RX ORDER — BUPIVACAINE HYDROCHLORIDE 5 MG/ML
INJECTION, SOLUTION PERINEURAL AS NEEDED
Status: DISCONTINUED | OUTPATIENT
Start: 2021-12-07 | End: 2021-12-07 | Stop reason: HOSPADM

## 2021-12-07 RX ORDER — FENTANYL CITRATE 50 UG/ML
INJECTION, SOLUTION INTRAMUSCULAR; INTRAVENOUS AS NEEDED
Status: DISCONTINUED | OUTPATIENT
Start: 2021-12-07 | End: 2021-12-07 | Stop reason: HOSPADM

## 2021-12-07 RX ORDER — NITROGLYCERIN 0.4 MG/1
0.4 TABLET SUBLINGUAL
Status: DISCONTINUED | OUTPATIENT
Start: 2021-12-07 | End: 2021-12-07 | Stop reason: HOSPADM

## 2021-12-07 RX ADMIN — DIPHENHYDRAMINE HYDROCHLORIDE 50 MG: 50 INJECTION INTRAMUSCULAR; INTRAVENOUS at 08:03

## 2021-12-07 RX ADMIN — FAMOTIDINE 20 MG: 10 INJECTION, SOLUTION INTRAVENOUS at 08:07

## 2021-12-07 RX ADMIN — CEFAZOLIN SODIUM 2 G: 2 INJECTION, SOLUTION INTRAVENOUS at 08:35

## 2021-12-07 RX ADMIN — METHYLPREDNISOLONE SODIUM SUCCINATE 125 MG: 125 INJECTION, POWDER, FOR SOLUTION INTRAMUSCULAR; INTRAVENOUS at 08:05

## 2021-12-07 NOTE — H&P
Cardiology H&P     Estefani Jordan  1940  [phone]    12/07/21    DATE OF ADMISSION: 12/7/2021  [facility]  [address] [facility] [address]    Referring: Dr Wright    CC: PVC, NICM     Problem list:    1. Frequent PVCs  1. Status post PVC ablation 2015 with Dr. Caro in [facility].   2. 14-day monitor 6/20/2021: 22% PVC burden, nonsustained ventricular tachycardia noted-longest run 5 beats  2. CAD  a. Status post stenting to the LAD in 2015; EF at that time was 25%.  b. Nuclear stress test 9/2020 OSH no evidence of ischemia.  c. LHC 7/22/2021: LVEf 25%, mod MR No hemodynamically significant, flow-limiting stenoses  3. Chronic systolic heart failure  a. EF 25% in 2015.  b. EF noted to be normal at time of nuclear stress testing 9/2020.  c. EF 7/9/2021 per TTE 21 to 25%.  d. August 2021 echocardiogram EF 31-35%.  Following segments are hypokinetic: Mid anterior, basal inferior septal, mid inferior septal, mid anterior septal and basal inferior septal.  Moderately dilated LV.  Mild to moderate MR  e. Echocardiogram October 11, 2021 EF 31 to 35%.  Following wall segments are hypokinetic: Mid anterior, basal inferior septal, mid inferoseptal, mid anterior septal and basal inferior septal.  Moderate LV dilation mild to moderate mitral valve regurgitation  4. LBBB  5. Hypertension  6. Hyperlipidemia      History of Present Illness: Mrs. Jordan is a pleasant 80-year-old  female referred by Dr. Wright regarding premature ventricular contractions and nonischemic cardiomyopathy.  She has  a longstanding history of PVCs dating back to at least 2015 at which point she underwent radiofrequency ablation by Dr. Caro in [facility].  She did well for several months following the procedure.  She has recurrence of palpitations.  Her symptoms are moderate in nature and include bothersome tachypalpitations, a sensation of her heart  racing that goes up into her throat.  She also has mild chest pain and shortness of breath.  In June 2021 a 14-day monitor revealed a 22% PVC burden along with brief nonsustained ventricular tachycardia.  She was initiated on amiodarone 200 mg daily in July and her symptoms have improved.  She also had a stress test in July and August revealing persistently low ejection fraction.  July 2021 stress test showed EF of 20 to 25% and echocardiogram in August showed an EF of 31 to 35%.  She continues with class II-III New York Heart Association CHF symptoms including fatigue, shortness of breath, weakness.  She has been compliant with medications including beta-blocker, Entresto, Lasix Farxiga    Patient presents today for ICD implant.  Denies any recent cough, cold, flu symptoms.  No fevers, chills, other signs or symptoms of infection. No recent emergency department visits, hospitalizations, new medical diagnoses. She has been compliant with prescribed medications.  Patient's only anticoagulation is aspirin 81 mg daily    Allergies   Allergen Reactions   • Contrast Dye Rash       Prior to Admission Medications     Prescriptions Last Dose Informant Patient Reported? Taking?    alendronate (FOSAMAX) 70 MG tablet   Yes No    Take 70 mg by mouth 1 (One) Time Per Week.    aspirin (Aspir-Low) 81 MG EC tablet   Yes No    Take 81 mg by mouth Daily.    atorvastatin (LIPITOR) 20 MG tablet   Yes No    Take 20 mg by mouth Daily.    cetirizine (ZyrTEC Allergy) 10 MG tablet   Yes No    Take 10 mg by mouth Daily.    dapagliflozin (Farxiga) 5 MG tablet tablet   No No    Take 1 tablet by mouth Daily.    diphenhydrAMINE (BENADRYL) 50 MG capsule   Yes No    Take 50 mg by mouth Every 6 (Six) Hours As Needed for Itching.    fluticasone (FLONASE) 50 MCG/ACT nasal spray   Yes No    2 sprays into the nostril(s) as directed by provider Daily.    furosemide (LASIX) 20 MG tablet   No No    Take 1 tablet by mouth Daily.    levothyroxine  (SYNTHROID, LEVOTHROID) 88 MCG tablet   Yes No    Take 88 mcg by mouth Daily.    metoprolol succinate XL (TOPROL-XL) 25 MG 24 hr tablet   No No    Take 0.5 tablets by mouth Daily.    montelukast (SINGULAIR) 10 MG tablet   Yes No    Take 10 mg by mouth every night at bedtime.    potassium chloride 10 MEQ CR tablet   No No    Take 1 tablet by mouth Daily. Only for 3 days with 40mg of lasix and then stop.    sacubitril-valsartan (Entresto) 24-26 MG tablet   No No    Take 1 tablet by mouth 2 (Two) Times a Day.    sertraline (ZOLOFT) 50 MG tablet   Yes No    Take 50 mg by mouth Daily.          Current Facility-Administered Medications:   •  acetaminophen (TYLENOL) tablet 650 mg, 650 mg, Oral, Q4H PRN, Purvi Espino APRN  •  ceFAZolin in dextrose (ANCEF) IVPB solution 2 g, 2 g, Intravenous, Once, Purvi Espino APRN  •  nitroglycerin (NITROSTAT) SL tablet 0.4 mg, 0.4 mg, Sublingual, Q5 Min PRN, Purvi Espino APRN  •  sodium chloride 0.9 % flush 10 mL, 10 mL, Intravenous, PRN, Purvi Espino APRN  •  sodium chloride 0.9 % flush 3 mL, 3 mL, Intravenous, Q12H, Purvi Espino APRN  •  sodium chloride 0.9 % infusion, 1 mL/kg/hr (Order-Specific), Intravenous, Continuous, Purvi Espino APRN, Held at 12/07/21 0710    Social History     Socioeconomic History   • Marital status:    Tobacco Use   • Smoking status: Never Smoker   • Smokeless tobacco: Never Used   Substance and Sexual Activity   • Alcohol use: Not Currently   • Drug use: Defer   • Sexual activity: Defer       Family History   Problem Relation Age of Onset   • Diabetes Mother    • Hypertension Father    • Hyperlipidemia Father    • No Known Problems Sister    • Cancer Brother    • No Known Problems Maternal Grandmother    • No Known Problems Maternal Grandfather    • No Known Problems Paternal Grandmother    • No Known Problems Paternal Grandfather        REVIEW OF SYSTEMS:   CONST:  No weight loss, fever,  "chills, +weakness or + fatigue.   HEENT:  No visual loss, blurred vision, double vision, yellow sclerae.                   No hearing loss, congestion, sore throat.   SKIN:      No rashes, urticaria, ulcers, sores.     RESP:     + shortness of breath, hemoptysis, cough, sputum.   GI:           No anorexia, nausea, vomiting, diarrhea. No abdominal pain, melena.   :         No burning on urination, hematuria or increased frequency.  ENDO:    No diaphoresis, cold or heat intolerance. No polyuria or polydipsia.   NEURO:  No headache, dizziness, syncope, paralysis, ataxia, or parasthesias.                  No change in bowel or bladder control. No history of CVA/TIA  MUSC:    No muscle, back pain, joint pain or stiffness.   HEME:    No anemia, bleeding, bruising. No history of DVT/PE.  PSYCH:  No history of depression, anxiety    Vitals:    12/07/21 0638 12/07/21 0640 12/07/21 0700   BP: 144/87 150/80    BP Location: Right arm Left arm    Pulse:  54    Temp: 98.2 °F (36.8 °C)     TempSrc: Tympanic     SpO2:  99%    Weight:   66.7 kg (147 lb 0.8 oz)   Height:   154.9 cm (61\")         Vital Sign Min/Max for last 24 hours  Temp  Min: 98.2 °F (36.8 °C)  Max: 98.2 °F (36.8 °C)   BP  Min: 144/87  Max: 150/80   Pulse  Min: 54  Max: 54   No data recorded   SpO2  Min: 99 %  Max: 99 %   No data recorded    No intake or output data in the 24 hours ending 12/07/21 0725          Physical Exam:  GEN: Well nourished, Well- developed  No acute distress  HEENT: Normocephalic, Atraumatic, PERRLA, moist mucous membranes  NECK: supple, NO JVD, no thyromegaly, no lymphadenopathy  CARD: S1S2 regular, bradycardic  no murmur, gallop, rub  LUNGS: Clear to auscultataion, normal respiratory effort  ABDOMEN: Soft, nontender, normal bowel sounds  EXTREMITIES:No gross deformities,  No clubbing, cyanosis, or edema  SKIN: Warm, dry  NEURO: No focal deficits  PSYCHIATRIC: Normal affect and mood      I personally viewed and interpreted the patient's " EKG/Telemetry/lab data    Data:   Pending      Telemetry: Normal sinus rhythm 50s   EK/3/21 Normal sinus rhythm with first-degree AV block and QRS of 178 ms      Assessment and Plan:   1) Premature ventricular contractions  -s/p PVC RFA with Dr. Chin in  with improvement of PVCs until the last 6 months. In 2020 monitor showed 22% PVC burden along with brief NSVT She is moderately symptomatic with palpitations, fatigue, sob and has been wearing a LifeVest.   -Now on low  dose metoprolol (12.5 mg daily) as well as amiodarone (initiated 2021) with some symptomatic improvement. Dr. Sánchez discussed PVCs in detail with patient and her  along with her persistently low EF. Will consider re do PVC RFA following ICD implant.        2) Dilated cardiomyopathy/Chronic systolic heart failure  -Follows with Dr. Wright and currently on Entresto,Toprol-XL,Farxiga, Lasix  -EF 2021 per TTE 21 to 25%.  -Echocardiogram 2021 EF 31 to 35%  -Normal Stress test in July with noted EF 21-25%, initiation of Entresto with some improvement in August to 31-35%.  -10/11/2021 echocardiogram EF 31-35%- Given that her ejection fraction has remained persistently low despite compliance with guideline directed medical therapy she is a good candidate for a biventricular ICD. Pt is being referred to for an Implantable Cardioverter- Defibrillator (ICD). I have discussed and allowed the pt to ask questions regarding ICD.  Pt was provided with a Shared Decision ICD booklet by the ROSALBA. The risks, benefits, and alternatives of the procedure have been reviewed and the patient  And her  Elmer at the bedside. They wish to proceed.     3) Conduction system disease-  -First-degree AV block and left bundle branch block noted on EKG,  ms    4. CAD with prior PCI in      Electronically signed by ROSALBA Landa, 21, 6:55 AM EST.

## 2021-12-08 ENCOUNTER — CALL CENTER PROGRAMS (OUTPATIENT)
Dept: CALL CENTER | Facility: HOSPITAL | Age: 81
End: 2021-12-08

## 2021-12-08 NOTE — OUTREACH NOTE
PCI/Device Survey      Responses   Facility patient discharged from? [facility]   Procedure date 12/07/21   Procedure (if device, specify in description) Device   Device Description  ICD implant   Performing MD Dr. Cortez Sánchez   Attempt successful? No   Unsuccessful attempts Attempt 2          Rosalia Baird RN

## 2021-12-08 NOTE — OUTREACH NOTE
PCI/Device Survey      Responses   Facility patient discharged from? [facility]   Procedure date 12/07/21   Procedure (if device, specify in description) Device   Device Description  ICD implant   Performing MD Dr. Cortez Sánchez   Attempt successful? No   Unsuccessful attempts Attempt 1          Rosalai Baird RN

## 2021-12-29 RX ORDER — AMIODARONE HYDROCHLORIDE 200 MG/1
TABLET ORAL
Qty: 90 TABLET | Refills: 0 | OUTPATIENT
Start: 2021-12-29

## 2021-12-29 RX ORDER — AMIODARONE HYDROCHLORIDE 200 MG/1
200 TABLET ORAL DAILY
Qty: 90 TABLET | Refills: 1 | Status: SHIPPED | OUTPATIENT
Start: 2021-12-29 | End: 2022-06-29

## 2021-12-29 NOTE — TELEPHONE ENCOUNTER
PT called in wondering if she needed to continue the Amiodarone 200mg.Saw in the PT's chart that Dr. Sánchez had discontinued the med. Spoke with Cori Strong RN and  ROSALBA Espinoza this med was discontinued in error. Sent in refill

## 2022-01-07 DIAGNOSIS — I50.22 CHRONIC SYSTOLIC HEART FAILURE: ICD-10-CM

## 2022-01-07 RX ORDER — FUROSEMIDE 20 MG/1
TABLET ORAL
Qty: 90 TABLET | Refills: 0 | Status: SHIPPED | OUTPATIENT
Start: 2022-01-07 | End: 2022-03-29

## 2022-01-16 PROCEDURE — 93296 REM INTERROG EVL PM/IDS: CPT | Performed by: INTERNAL MEDICINE

## 2022-01-16 PROCEDURE — 93295 DEV INTERROG REMOTE 1/2/MLT: CPT | Performed by: INTERNAL MEDICINE

## 2022-02-07 RX ORDER — SACUBITRIL AND VALSARTAN 24; 26 MG/1; MG/1
1 TABLET, FILM COATED ORAL 2 TIMES DAILY
Qty: 60 TABLET | Refills: 3 | Status: SHIPPED | OUTPATIENT
Start: 2022-02-07 | End: 2022-02-11 | Stop reason: SDUPTHER

## 2022-02-11 RX ORDER — SACUBITRIL AND VALSARTAN 24; 26 MG/1; MG/1
1 TABLET, FILM COATED ORAL 2 TIMES DAILY
Qty: 180 TABLET | Refills: 3 | Status: SHIPPED | OUTPATIENT
Start: 2022-02-11 | End: 2023-02-17

## 2022-03-16 NOTE — PROGRESS NOTES
Electrophysiology Clinic Consult     Estefani Jordan  1940  [unfilled]  [unfilled]    03/17/22    DATE OF ADMISSION: (Not on file)  [facility] CARDIOLOGY    Alda Ayers MD  [address] [address]  Referring Provider: No ref. provider found     Chief Complaint   Patient presents with   • frequent PVCs     Referring: Dr. Wright    CC: PVC, nonischemic cardiomyopathy    Problem list:    1. Frequent PVCs  1. Status post PVC ablation 2015 with Dr. Caro in [facility].   2. 14-day monitor 6/20/2021: 22% PVC burden, nonsustained ventricular tachycardia noted-longest run 5 beats  3. BIVICD Dr. Sánchez 12/7/2021  2. CAD  a. Status post stenting to the LAD in 2015; EF at that time was 25%.  b. Nuclear stress test 9/2020 OSH no evidence of ischemia.  c. LHC 7/22/2021: LVEF 25%, mod MR No hemodynamically significant, flow-limiting stenoses  3. Chronic systolic heart failure  a. EF 25% in 2015.  b. EF noted to be normal at time of nuclear stress testing 9/2020.  c. EF 7/9/2021 per TTE 21 to 25%.  d. August 2021 echocardiogram EF 31-35%.  Following segments are hypokinetic: Mid anterior, basal inferior septal, mid inferior septal, mid anterior septal and basal inferior septal.  Moderately dilated LV.  Mild to moderate MR  4. LBBB  5. Hypertension  6. Hyperlipidemia    History of Present Illness:     Mrs. Jordan is a pleasant 80-year-old  female referred by Dr. Wright for consultation regarding premature ventricular contractions seen in follow up after BIVICD implant in December 2021. Since the procedure, she states she overall feels better with more energy and less SOB. She denies palpitations. She does mention that she still has chest pain every couple of days that lasts 1-2 minutes and is triggered by exertion at times and sometimes occurs at rest. It feels like an ache in her and sometimes radiates to her back. She states that it is the same  frequency and has not worsened since before her C in 7/2022, which showed a patent stent and otherwise no significant blockages. Denies any hospitalizations, ER visits, bleeding, or TIA/CVA symptoms. Overall feels well. She remains on amiodarone for PVC's. She is tolerating her Toprol, Entresto and lasix. She has occasional abdominal swelling.       Allergies   Allergen Reactions   • Contrast Dye Rash        Cannot display prior to admission medications because the patient has not been admitted in this contact.            Current Outpatient Medications:   •  alendronate (FOSAMAX) 70 MG tablet, Take 70 mg by mouth 1 (One) Time Per Week., Disp: , Rfl:   •  amiodarone (PACERONE) 200 MG tablet, Take 1 tablet by mouth Daily., Disp: 90 tablet, Rfl: 1  •  aspirin 81 MG EC tablet, Take 81 mg by mouth Daily., Disp: , Rfl:   •  atorvastatin (LIPITOR) 20 MG tablet, Take 20 mg by mouth Daily., Disp: , Rfl:   •  cetirizine (zyrTEC) 10 MG tablet, Take 10 mg by mouth Daily., Disp: , Rfl:   •  diphenhydrAMINE (BENADRYL) 50 MG capsule, Take 50 mg by mouth Every 6 (Six) Hours As Needed for Itching., Disp: , Rfl:   •  fluticasone (FLONASE) 50 MCG/ACT nasal spray, 2 sprays into the nostril(s) as directed by provider Daily., Disp: , Rfl:   •  furosemide (LASIX) 20 MG tablet, TAKE 1 TABLET EVERY DAY, Disp: 90 tablet, Rfl: 0  •  levothyroxine (SYNTHROID, LEVOTHROID) 88 MCG tablet, Take 88 mcg by mouth Daily., Disp: , Rfl:   •  metoprolol succinate XL (TOPROL-XL) 25 MG 24 hr tablet, Take 0.5 tablets by mouth Daily., Disp: 45 tablet, Rfl: 1  •  montelukast (SINGULAIR) 10 MG tablet, Take 10 mg by mouth every night at bedtime., Disp: , Rfl:   •  sacubitril-valsartan (Entresto) 24-26 MG tablet, Take 1 tablet by mouth 2 (Two) Times a Day., Disp: 180 tablet, Rfl: 3  •  sertraline (ZOLOFT) 50 MG tablet, Take 50 mg by mouth Daily., Disp: , Rfl:   •  isosorbide mononitrate (IMDUR) 30 MG 24 hr tablet, Take 1 tablet by mouth Daily., Disp: 30  "tablet, Rfl: 11  •  potassium chloride 10 MEQ CR tablet, Take 1 tablet by mouth Daily. Only for 3 days with 40mg of lasix and then stop., Disp: 30 tablet, Rfl: 0    Social History     Socioeconomic History   • Marital status:    Tobacco Use   • Smoking status: Never Smoker   • Smokeless tobacco: Never Used   Substance and Sexual Activity   • Alcohol use: Not Currently   • Drug use: Defer   • Sexual activity: Defer       Family History   Problem Relation Age of Onset   • Diabetes Mother    • Hypertension Father    • Hyperlipidemia Father    • No Known Problems Sister    • Cancer Brother    • No Known Problems Maternal Grandmother    • No Known Problems Maternal Grandfather    • No Known Problems Paternal Grandmother    • No Known Problems Paternal Grandfather      Vitals:    03/17/22 1025   BP: 124/76   BP Location: Left arm   Patient Position: Sitting   Pulse: 59   SpO2: 97%   Weight: 64.9 kg (143 lb)   Height: 154.9 cm (61\")                 Physical Exam:  GEN: Well nourished, well-developed, no acute distress  HEENT: Normocephalic, atraumatic, PERRLA, moist mucous membranes  NECK: Supple, NO JVD, no thyromegaly, no lymphadenopathy  CARD: S1S2, RRR, S3 present +MR murmur, PMI not appreciated  LUNGS: Clear to auscultation, normal respiratory effort  ABDOMEN: Soft, nontender, normal bowel sounds  EXTREMITIES: No gross deformities, no clubbing, cyanosis, or edema  SKIN: Warm, dry, no lesions  NEURO: No focal deficits, alert and oriented x 3  PSYCHIATRIC: Normal affect and mood      I personally viewed and interpreted the patient's EKG/Telemetry/lab data    Lab Results   Component Value Date    GLUCOSE 76 12/07/2021    CALCIUM 8.7 12/07/2021     12/07/2021    K 3.5 12/07/2021    CO2 27.0 12/07/2021     12/07/2021    BUN 23 12/07/2021    CREATININE 0.90 12/07/2021    EGFRIFNONA 50 (L) 12/07/2021    BCR 21.5 12/07/2021    ANIONGAP 11.0 12/07/2021     Lab Results   Component Value Date    WBC 6.63 " 12/07/2021    HGB 13.6 12/07/2021    HCT 40 12/07/2021    MCV 95.9 12/07/2021     12/07/2021     No results found for: INR, PROTIME  No results found for: TSH, W8XGAHX, I1GKPMV, THYROIDAB    BiV ICD Manual Interrogation: normal function. Less than 1% PVCs. BiV paced 99%. Battery 95%.       ECG 12 Lead    Date/Time: 3/17/2022 11:09 AM  Performed by: Alivia Geronimo PA  Authorized by: Alivia Geronimo PA   Comparison: compared with previous ECG from 11/3/2021  Comparison to previous ECG: Now BiV paced   Rhythm: sinus rhythm and paced              ICD-10-CM ICD-9-CM   1. Ventricular premature beats  I49.3 427.69   2. Cardiomyopathy, dilated (HCC)  I42.0 425.4   3. Essential hypertension  I10 401.9   4. Chronic systolic CHF (congestive heart failure) (HCC)  I50.22 428.22     428.0   5. Other chest pain  R07.89 786.59       Assessment and Plan:   1) Premature ventricular contractions  -s/p PVC RFA with Dr. Chin in 2015. Currently on Amiodarone 200 mg.   - Less than 1% PVCS on device, doing well. Will check liver function and thyroid function tests with Dr. Ayers.   -long-term may consider redo PVC ablation in the future.     2) Dilated cardiomyopathy  -Follows with Dr. Wright and currently on Entresto,Toprol-XL,Farxiga, Lasix.   - normal BiV ICD function today.     3) Chronic Systolic CHF:  -Entresto, Toprol XL, Farxiga. Take extra lasix for 3 days to improve swelling.   -Class I symptoms, improved symptoms since BiV ICD implant.     4) Chest Pain:  - she has had chest pain intermittently, which sounds like it could be an anginal type chest pain, but she has had these symptoms for greater than 9 months, unchanged from what she experienced prior to [facility] 7/22/2021 which showed patent stent and no significant coronary disease  - prescribed Imdur 30 mg daily.   - she needs to follow up with Dr. Wright for further recommendations.     Follow up in 6 months     Electronically signed by TONO Henry, 03/17/22,  10:44 AM EDT.

## 2022-03-17 ENCOUNTER — OFFICE VISIT (OUTPATIENT)
Dept: CARDIOLOGY | Facility: CLINIC | Age: 82
End: 2022-03-17

## 2022-03-17 VITALS
WEIGHT: 143 LBS | OXYGEN SATURATION: 97 % | HEIGHT: 61 IN | BODY MASS INDEX: 27 KG/M2 | DIASTOLIC BLOOD PRESSURE: 76 MMHG | HEART RATE: 59 BPM | SYSTOLIC BLOOD PRESSURE: 124 MMHG

## 2022-03-17 DIAGNOSIS — I42.0 CARDIOMYOPATHY, DILATED: ICD-10-CM

## 2022-03-17 DIAGNOSIS — I49.3 VENTRICULAR PREMATURE BEATS: Primary | ICD-10-CM

## 2022-03-17 DIAGNOSIS — I10 ESSENTIAL HYPERTENSION: ICD-10-CM

## 2022-03-17 DIAGNOSIS — R07.89 OTHER CHEST PAIN: ICD-10-CM

## 2022-03-17 DIAGNOSIS — I50.22 CHRONIC SYSTOLIC CHF (CONGESTIVE HEART FAILURE): ICD-10-CM

## 2022-03-17 PROCEDURE — 93284 PRGRMG EVAL IMPLANTABLE DFB: CPT | Performed by: PHYSICIAN ASSISTANT

## 2022-03-17 PROCEDURE — 93000 ELECTROCARDIOGRAM COMPLETE: CPT | Performed by: PHYSICIAN ASSISTANT

## 2022-03-17 PROCEDURE — 99214 OFFICE O/P EST MOD 30 MIN: CPT | Performed by: PHYSICIAN ASSISTANT

## 2022-03-17 RX ORDER — ISOSORBIDE MONONITRATE 30 MG/1
30 TABLET, EXTENDED RELEASE ORAL DAILY
Qty: 30 TABLET | Refills: 11 | Status: SHIPPED | OUTPATIENT
Start: 2022-03-17 | End: 2022-10-20 | Stop reason: ALTCHOICE

## 2022-03-22 ENCOUNTER — TELEPHONE (OUTPATIENT)
Dept: CARDIOLOGY | Facility: CLINIC | Age: 82
End: 2022-03-22

## 2022-03-22 NOTE — TELEPHONE ENCOUNTER
Patient was seen in clinic on 3/17/22. She needs to f/u with Dr. Wright because she is experiencing chest pain.

## 2022-03-29 ENCOUNTER — OFFICE VISIT (OUTPATIENT)
Dept: CARDIOLOGY | Facility: CLINIC | Age: 82
End: 2022-03-29

## 2022-03-29 VITALS — BODY MASS INDEX: 27 KG/M2 | WEIGHT: 143 LBS | OXYGEN SATURATION: 96 % | HEIGHT: 61 IN

## 2022-03-29 DIAGNOSIS — I50.22 CHRONIC SYSTOLIC HEART FAILURE: ICD-10-CM

## 2022-03-29 DIAGNOSIS — I44.7 LEFT BUNDLE BRANCH BLOCK: ICD-10-CM

## 2022-03-29 DIAGNOSIS — I49.3 FREQUENT PVCS: ICD-10-CM

## 2022-03-29 DIAGNOSIS — I49.3 VENTRICULAR PREMATURE BEATS: ICD-10-CM

## 2022-03-29 DIAGNOSIS — I25.10 CORONARY ARTERIOSCLEROSIS IN NATIVE ARTERY: ICD-10-CM

## 2022-03-29 DIAGNOSIS — I10 ESSENTIAL HYPERTENSION: ICD-10-CM

## 2022-03-29 DIAGNOSIS — I50.21 ACUTE SYSTOLIC CHF (CONGESTIVE HEART FAILURE): ICD-10-CM

## 2022-03-29 DIAGNOSIS — I25.708 CORONARY ARTERY DISEASE OF BYPASS GRAFT OF NATIVE HEART WITH STABLE ANGINA PECTORIS: Primary | ICD-10-CM

## 2022-03-29 DIAGNOSIS — E78.00 HYPERCHOLESTEROLEMIA: ICD-10-CM

## 2022-03-29 DIAGNOSIS — I42.0 CARDIOMYOPATHY, DILATED: ICD-10-CM

## 2022-03-29 PROCEDURE — 99214 OFFICE O/P EST MOD 30 MIN: CPT | Performed by: INTERNAL MEDICINE

## 2022-03-29 PROCEDURE — 93000 ELECTROCARDIOGRAM COMPLETE: CPT | Performed by: INTERNAL MEDICINE

## 2022-03-29 RX ORDER — FUROSEMIDE 40 MG/1
40 TABLET ORAL DAILY
Qty: 30 TABLET | Refills: 11 | Status: SHIPPED | OUTPATIENT
Start: 2022-03-29 | End: 2022-03-31 | Stop reason: SDUPTHER

## 2022-03-29 NOTE — PROGRESS NOTES
[facility] Cardiology   [address], [address]  [place], [place], [address]    [phone]  [email or web address]           OUTPATIENT CLINIC FOLLOW UP NOTE    Patient Care Team:  Patient Care Team:  Alda Ayers MD as PCP - General (Internal Medicine)    Subjective:      Chief Complaint   Patient presents with   • Chest Pain       HPI:    Estefani Jordan is a 81 y.o. female.  Problem list:  1. CAD  a. Status post stenting to the LAD in 2015; EF at that time was 25%.  b. Nuclear stress test 9/2020 OSH with normal EF and no evidence of ischemia.  c. LHC 7/22/2021: No hemodynamically significant, flow-limiting stenoses  2. Chronic systolic heart failure, ischemic cardiomyopathy  a. EF 25% in 2015.  b. EF noted to be normal at time of nuclear stress testing 9/2020.  c. EF 7/9/2021 per TTE 21 to 25%.  d. Status post BiV ICD 12/07/2021, Dr. Sánchez  3. Frequent PVCs  a. Status post PVC ablation in 2015.  b. 14-day cardiac monitor 6/2021: 22% PVC burden, NSVT noted with longest being 5 beats.  4. Hypertension  5. Hyperlipidemia    She presents today for follow-up.    Since her last visit, patient underwent BiV ICD implant with Dr. Sánchez, 12/2021.  On follow-up in the EP clinic 3/17/2022, patient has been having mild, intermittent chest pains.  She notes these have been ongoing for several years.  She was started on isosorbide and increased Lasix to 40 mg at that time.  She has not had any further chest pains since beginning Imdur and increase Lasix.  Denies chest pain, palpitations, shortness of breath, lightheadedness or syncope.  Lower extremity edema also improved.    Review of Systems:  As noted in above HPI.      PFSH:  Patient Active Problem List   Diagnosis   • Coronary arteriosclerosis in native artery   • Chronic systolic heart failure (HCC)   • Essential hypertension   • Gastroesophageal reflux disease   • History of depression   • Hypercholesterolemia   • Left  "bundle branch block   • Obstructive sleep apnea syndrome   • Ventricular premature beats   • Acute systolic CHF (congestive heart failure) (HCC)   • Coronary artery disease of bypass graft of native heart with stable angina pectoris (HCC)   • Frequent PVCs   • Cardiomyopathy, dilated (HCC)         Current Outpatient Medications:   •  alendronate (FOSAMAX) 70 MG tablet, Take 70 mg by mouth 1 (One) Time Per Week., Disp: , Rfl:   •  amiodarone (PACERONE) 200 MG tablet, Take 1 tablet by mouth Daily., Disp: 90 tablet, Rfl: 1  •  aspirin 81 MG EC tablet, Take 81 mg by mouth Daily., Disp: , Rfl:   •  atorvastatin (LIPITOR) 20 MG tablet, Take 20 mg by mouth Daily., Disp: , Rfl:   •  cetirizine (zyrTEC) 10 MG tablet, Take 10 mg by mouth Daily., Disp: , Rfl:   •  diphenhydrAMINE (BENADRYL) 50 MG capsule, Take 50 mg by mouth Every 6 (Six) Hours As Needed for Itching., Disp: , Rfl:   •  fluticasone (FLONASE) 50 MCG/ACT nasal spray, 2 sprays into the nostril(s) as directed by provider Daily., Disp: , Rfl:   •  furosemide (LASIX) 40 MG tablet, Take 1 tablet by mouth Daily., Disp: 30 tablet, Rfl: 11  •  isosorbide mononitrate (IMDUR) 30 MG 24 hr tablet, Take 1 tablet by mouth Daily., Disp: 30 tablet, Rfl: 11  •  levothyroxine (SYNTHROID, LEVOTHROID) 88 MCG tablet, Take 88 mcg by mouth Daily., Disp: , Rfl:   •  metoprolol succinate XL (TOPROL-XL) 25 MG 24 hr tablet, Take 0.5 tablets by mouth Daily., Disp: 45 tablet, Rfl: 1  •  montelukast (SINGULAIR) 10 MG tablet, Take 10 mg by mouth every night at bedtime., Disp: , Rfl:   •  sacubitril-valsartan (Entresto) 24-26 MG tablet, Take 1 tablet by mouth 2 (Two) Times a Day., Disp: 180 tablet, Rfl: 3  •  sertraline (ZOLOFT) 50 MG tablet, Take 50 mg by mouth Daily., Disp: , Rfl:     Allergies   Allergen Reactions   • Contrast Dye Rash        reports that she has never smoked. She has never used smokeless tobacco.      Objective:   Physical exam:  Ht 154.9 cm (60.98\")   Wt 64.9 kg (143 lb)  "  SpO2 96%   BMI 27.03 kg/m²   CONSTITUTIONAL: No acute distress  RESPIRATORY: Normal effort. Clear to auscultation bilaterally without wheezing or rales  CARDIOVASCULAR: Carotids with normal upstrokes without bruits.  Regular rate and rhythm with normal S1 and S2. Without murmur, gallop or rub. Normal left radial pulse. There is no lower extremity edema bilaterally.    Labs:    BUN   Date Value Ref Range Status   12/07/2021 23 8 - 23 mg/dL Final     Creatinine   Date Value Ref Range Status   12/07/2021 0.90 0.60 - 1.30 mg/dL Final     Comment:     Serial Number: [id number]:  [id number]   12/07/2021 1.07 (H) 0.57 - 1.00 mg/dL Final     Potassium   Date Value Ref Range Status   12/07/2021 3.5 3.5 - 5.2 mmol/L Final     Comment:     Slight hemolysis detected by analyzer. Results may be affected.     ALT (SGPT)   Date Value Ref Range Status   07/19/2021 22 1 - 33 U/L Final     AST (SGOT)   Date Value Ref Range Status   07/19/2021 23 1 - 32 U/L Final       Lab Results   Component Value Date    CHOL 144 07/22/2021     Lab Results   Component Value Date    TRIG 49 07/22/2021     Lab Results   Component Value Date    HDL 61 (H) 07/22/2021     Lab Results   Component Value Date    LDL 72 07/22/2021     No components found for: LDLDIRECTC    Diagnostic Data:      ECG 12 Lead    Date/Time: 3/29/2022 5:04 PM  Performed by: Yobany Wright MD  Authorized by: Yobany Wright MD   Comparison: compared with previous ECG from 3/17/2022  Comparison to previous ECG: Was V pace, now AV paced.   Rhythm: paced  Rate: normal  BPM: 60  Conduction: right bundle branch block            Results for orders placed during the hospital encounter of 10/11/21    Adult Transthoracic Echo Limited W/ Cont if Necessary Per Protocol    Interpretation Summary  · Left ventricular ejection fraction appears to be 31 - 35%. Left ventricular systolic function is moderately decreased.  · The following left ventricular wall segments are hypokinetic: mid  anterior, basal inferoseptal, mid inferoseptal, mid anteroseptal and basal inferoseptal.  · The left ventricular cavity is moderately dilated.  · Mild to moderate mitral valve regurgitation.    [facility] 7/22/2021  · There are no hemodynamically significant, flow-limiting stenoses.  The previously placed stent in the LAD is widely patent.  · Severely decreased left ventricular ejection fraction 20 to 25% with global hypokinesis, a dilated left ventricle, and at least moderate mitral regurgitation.  Elevated LVEDP of 20 mmHg    Holter monitor 6/7/2021  · PVC burden 22%. The 6 patient events were associated with PVCs. NSVT present, longest only 5 beats.  · Findings already discussed with the ordering provider, ROSALBA Guidry.      Assessment and Plan:   Diagnoses and all orders for this visit:    Frequent PVCs   Left bundle branch block  Relative bradycardia  -Prior PVC ablation in 2015.  -Continue amiodarone, metoprolol    Chronic systolic heart failure   Mitral regurgitation  -Status post Biv ICD with Dr. Sánchez, 12/2021.  -Continue Entresto, beta-blocker, Farxiga, Imdur  -Continue Lasix 40 mg daily as she has had improvement in her symptoms since increased dose.   -Repeat BMP, proBNP to monitor renal function, potassium and efficacy.     Coronary artery of native artery of native heart with stable angina pectoris   -With recurrent, mild chest pain.  Improved with addition of Imdur and increase in Lasix to 40 mg daily.   -No flow-limiting stenoses noted on cardiac catheterization 7/22/2021  -Continue related medications.     Essential hypertension  -Continue current related medications.      - Return in about 6 months (around 9/29/2022) for Next scheduled follow up.    Scribed for Yobany Wright MD by ROSALBA Gamez. 3/29/2022  17:11 EDT    I, Yobany Wright MD, personally performed the services as scribed by the above named individual. I have made any necessary edits and it is both accurate and complete.      Yobany Wright MD, MSc, FACC, [facility]  Interventional Cardiology  [facility]

## 2022-03-31 DIAGNOSIS — I50.22 CHRONIC SYSTOLIC HEART FAILURE: ICD-10-CM

## 2022-03-31 RX ORDER — FUROSEMIDE 40 MG/1
40 TABLET ORAL DAILY
Qty: 90 TABLET | Refills: 3 | Status: SHIPPED | OUTPATIENT
Start: 2022-03-31 | End: 2022-04-12 | Stop reason: SDUPTHER

## 2022-04-01 DIAGNOSIS — I42.0 CARDIOMYOPATHY, DILATED: ICD-10-CM

## 2022-04-01 DIAGNOSIS — I50.22 CHRONIC SYSTOLIC HEART FAILURE: ICD-10-CM

## 2022-04-01 DIAGNOSIS — I25.708 CORONARY ARTERY DISEASE OF BYPASS GRAFT OF NATIVE HEART WITH STABLE ANGINA PECTORIS: ICD-10-CM

## 2022-04-01 LAB
BUN SERPL-MCNC: 30 MG/DL (ref 8–27)
BUN/CREAT SERPL: 23 (ref 12–28)
CALCIUM SERPL-MCNC: 8.9 MG/DL (ref 8.7–10.3)
CHLORIDE SERPL-SCNC: 99 MMOL/L (ref 96–106)
CO2 SERPL-SCNC: 24 MMOL/L (ref 20–29)
CREAT SERPL-MCNC: 1.31 MG/DL (ref 0.57–1)
EGFRCR SERPLBLD CKD-EPI 2021: 41 ML/MIN/1.73
GLUCOSE SERPL-MCNC: 105 MG/DL (ref 65–99)
NT-PROBNP SERPL-MCNC: 405 PG/ML (ref 0–738)
POTASSIUM SERPL-SCNC: 4 MMOL/L (ref 3.5–5.2)
SODIUM SERPL-SCNC: 141 MMOL/L (ref 134–144)

## 2022-04-12 ENCOUNTER — TELEPHONE (OUTPATIENT)
Dept: CARDIOLOGY | Facility: CLINIC | Age: 82
End: 2022-04-12

## 2022-04-12 DIAGNOSIS — I50.22 CHRONIC SYSTOLIC HEART FAILURE: ICD-10-CM

## 2022-04-12 RX ORDER — FUROSEMIDE 40 MG/1
20 TABLET ORAL DAILY
Qty: 90 TABLET | Refills: 3
Start: 2022-04-12

## 2022-04-12 NOTE — TELEPHONE ENCOUNTER
----- Message from ROSALBA Morrison sent at 4/12/2022  4:12 PM EDT -----  Can you let the patient know that her kidney function was slightly elevated on her recent blood work.  She will need to decrease her Lasix back to 20 mg daily and she can also take an extra dose of Lasix 20 mg once in a while for swelling or shortness of breath.  Thank you.     Pt aware and agreeable. She will cut current 40 mg tablets and call if new Rx needed.

## 2022-04-17 PROCEDURE — 93295 DEV INTERROG REMOTE 1/2/MLT: CPT | Performed by: INTERNAL MEDICINE

## 2022-04-17 PROCEDURE — 93296 REM INTERROG EVL PM/IDS: CPT | Performed by: INTERNAL MEDICINE

## 2022-06-20 ENCOUNTER — OFFICE VISIT (OUTPATIENT)
Dept: CARDIOLOGY | Facility: CLINIC | Age: 82
End: 2022-06-20

## 2022-06-20 VITALS
OXYGEN SATURATION: 99 % | DIASTOLIC BLOOD PRESSURE: 72 MMHG | WEIGHT: 142 LBS | SYSTOLIC BLOOD PRESSURE: 128 MMHG | HEART RATE: 62 BPM | BODY MASS INDEX: 26.81 KG/M2 | HEIGHT: 61 IN

## 2022-06-20 DIAGNOSIS — I44.7 LEFT BUNDLE BRANCH BLOCK: ICD-10-CM

## 2022-06-20 DIAGNOSIS — I10 ESSENTIAL HYPERTENSION: ICD-10-CM

## 2022-06-20 DIAGNOSIS — I49.3 FREQUENT PVCS: ICD-10-CM

## 2022-06-20 DIAGNOSIS — I25.708 CORONARY ARTERY DISEASE OF BYPASS GRAFT OF NATIVE HEART WITH STABLE ANGINA PECTORIS: ICD-10-CM

## 2022-06-20 DIAGNOSIS — I50.22 CHRONIC SYSTOLIC HEART FAILURE: Primary | ICD-10-CM

## 2022-06-20 PROCEDURE — 93000 ELECTROCARDIOGRAM COMPLETE: CPT | Performed by: INTERNAL MEDICINE

## 2022-06-20 PROCEDURE — 99214 OFFICE O/P EST MOD 30 MIN: CPT | Performed by: INTERNAL MEDICINE

## 2022-06-20 NOTE — PROGRESS NOTES
[facility] Cardiology   [address], [address]  [place], [place], [address]    [phone]  WWW.[facility]           OUTPATIENT CLINIC FOLLOW UP NOTE    Patient Care Team:  Patient Care Team:  Alda Ayers MD as PCP - General (Internal Medicine)    Subjective:      Chief Complaint   Patient presents with   • Coronary Artery Disease   • PVC       HPI:    Estefani Jordan is a 81 y.o. female.  Problem list:  1. CAD  a. Status post stenting to the LAD in 2015; EF at that time was 25%.  b. Nuclear stress test 9/2020 OSH with normal EF and no evidence of ischemia.  c. LHC 7/22/2021: No hemodynamically significant, flow-limiting stenoses  2. Chronic systolic heart failure, ischemic cardiomyopathy  a. EF 25% in 2015.  b. EF noted to be normal at time of nuclear stress testing 9/2020.  c. EF 7/9/2021 per TTE 21 to 25%.  d. Status post BiV ICD 12/07/2021, Dr. Sánchez  3. Frequent PVCs  a. Status post PVC ablation in 2015.  b. 14-day cardiac monitor 6/2021: 22% PVC burden, NSVT noted with longest being 5 beats.  4. Hypertension  5. Hyperlipidemia    She presents today for follow-up.    Mild, intermittent chest pains have overall improved.  No notable chest pains.  Has had chronic chest pain for years. Unremarkable C 7/2021.      Denies palpitations, shortness of breath, lightheadedness or syncope.  Lower extremity edema also improved.    Review of Systems:  As noted in above HPI.      PFSH:  Patient Active Problem List   Diagnosis   • Coronary arteriosclerosis in native artery   • Chronic systolic heart failure (HCC)   • Essential hypertension   • Gastroesophageal reflux disease   • History of depression   • Hypercholesterolemia   • Left bundle branch block   • Obstructive sleep apnea syndrome   • Ventricular premature beats   • Acute systolic CHF (congestive heart failure) ([facility])   • Coronary artery disease of bypass graft of native heart with stable angina pectoris ([facility])    • Frequent PVCs   • Cardiomyopathy, dilated (HCC)         Current Outpatient Medications:   •  alendronate (FOSAMAX) 70 MG tablet, Take 70 mg by mouth 1 (One) Time Per Week., Disp: , Rfl:   •  amiodarone (PACERONE) 200 MG tablet, Take 1 tablet by mouth Daily., Disp: 90 tablet, Rfl: 1  •  aspirin 81 MG EC tablet, Take 81 mg by mouth Daily., Disp: , Rfl:   •  atorvastatin (LIPITOR) 20 MG tablet, Take 20 mg by mouth Daily., Disp: , Rfl:   •  cetirizine (zyrTEC) 10 MG tablet, Take 10 mg by mouth Daily., Disp: , Rfl:   •  diphenhydrAMINE (BENADRYL) 50 MG capsule, Take 50 mg by mouth Every 6 (Six) Hours As Needed for Itching., Disp: , Rfl:   •  fluticasone (FLONASE) 50 MCG/ACT nasal spray, 2 sprays into the nostril(s) as directed by provider Daily., Disp: , Rfl:   •  furosemide (LASIX) 40 MG tablet, Take 0.5 tablets by mouth Daily., Disp: 90 tablet, Rfl: 3  •  isosorbide mononitrate (IMDUR) 30 MG 24 hr tablet, Take 1 tablet by mouth Daily., Disp: 30 tablet, Rfl: 11  •  levothyroxine (SYNTHROID, LEVOTHROID) 88 MCG tablet, Take 88 mcg by mouth Daily., Disp: , Rfl:   •  metoprolol succinate XL (TOPROL-XL) 25 MG 24 hr tablet, Take 0.5 tablets by mouth Daily., Disp: 45 tablet, Rfl: 1  •  montelukast (SINGULAIR) 10 MG tablet, Take 10 mg by mouth every night at bedtime., Disp: , Rfl:   •  sacubitril-valsartan (Entresto) 24-26 MG tablet, Take 1 tablet by mouth 2 (Two) Times a Day., Disp: 180 tablet, Rfl: 3  •  sertraline (ZOLOFT) 50 MG tablet, Take 50 mg by mouth Daily., Disp: , Rfl:     Allergies   Allergen Reactions   • Contrast Dye Rash        reports that she has never smoked. She has never used smokeless tobacco.      Objective:   Physical exam:  There were no vitals taken for this visit.  CONSTITUTIONAL: No acute distress  RESPIRATORY: Normal effort. Clear to auscultation bilaterally without wheezing or rales  CARDIOVASCULAR: Carotids with normal upstrokes without bruits.  Regular rate and rhythm with normal S1 and S2.  Without murmur, gallop or rub. Normal left radial pulse. There is no lower extremity edema bilaterally.    Labs:      Lab Results   Component Value Date    CHOL 144 07/22/2021     Lab Results   Component Value Date    TRIG 49 07/22/2021     Lab Results   Component Value Date    HDL 61 (H) 07/22/2021     Lab Results   Component Value Date    LDL 72 07/22/2021     No components found for: LDLDIRECTC    Diagnostic Data:      ECG 12 Lead    Date/Time: 6/20/2022 2:31 PM  Performed by: Yoabny Wright MD  Authorized by: Yobany Wright MD   Comparison: compared with previous ECG from 3/29/2022  Comments: AV paced            Results for orders placed during the hospital encounter of 10/11/21    Adult Transthoracic Echo Limited W/ Cont if Necessary Per Protocol    Interpretation Summary  · Left ventricular ejection fraction appears to be 31 - 35%. Left ventricular systolic function is moderately decreased.  · The following left ventricular wall segments are hypokinetic: mid anterior, basal inferoseptal, mid inferoseptal, mid anteroseptal and basal inferoseptal.  · The left ventricular cavity is moderately dilated.  · Mild to moderate mitral valve regurgitation.    [facility] 7/22/2021  · There are no hemodynamically significant, flow-limiting stenoses.  The previously placed stent in the LAD is widely patent.  · Severely decreased left ventricular ejection fraction 20 to 25% with global hypokinesis, a dilated left ventricle, and at least moderate mitral regurgitation.  Elevated LVEDP of 20 mmHg    Holter monitor 6/7/2021  · PVC burden 22%. The 6 patient events were associated with PVCs. NSVT present, longest only 5 beats.  · Findings already discussed with the ordering provider, ROSALBA Guidry.      Assessment and Plan:     Frequent PVCs   Left bundle branch block  Relative bradycardia  -Prior PVC ablation in 2015.  -Continue EP follow-up, amiodarone, metoprolol    Coronary artery of native artery of native heart with stable angina  pectoris   Chronic systolic heart failure   Mitral regurgitation  -EF improved from 20-25% to 30-35% in 10/2021, Status post Biv ICD with Dr. Sánchez, 12/2021.  -No flow-limiting stenoses noted on cardiac catheterization 7/22/2021    -Continue Entresto, beta-blocker, Farxiga, Imdur  -Continue Lasix at lower dose of 20 mg daily, with an additional 20 mg as needed.  Was on 40 mg daily, but had mild renal insufficiency    Essential hypertension  -Continue current related medications.      - Return in about 6 months (around 12/20/2022) for Next scheduled follow up, or sooner if needed.   -Switch to annual visits if stable at next visit    Yobany Wright MD, MSc, FACC, [facility]  Interventional Cardiology  [facility]

## 2022-06-29 RX ORDER — AMIODARONE HYDROCHLORIDE 200 MG/1
TABLET ORAL
Qty: 90 TABLET | Refills: 3 | Status: SHIPPED | OUTPATIENT
Start: 2022-06-29 | End: 2022-10-20

## 2022-09-09 RX ORDER — METOPROLOL SUCCINATE 25 MG/1
12.5 TABLET, EXTENDED RELEASE ORAL DAILY
Qty: 45 TABLET | Refills: 3 | Status: SHIPPED | OUTPATIENT
Start: 2022-09-09

## 2022-10-20 ENCOUNTER — TELEPHONE (OUTPATIENT)
Dept: CARDIOLOGY | Facility: CLINIC | Age: 82
End: 2022-10-20

## 2022-10-20 ENCOUNTER — OFFICE VISIT (OUTPATIENT)
Dept: CARDIOLOGY | Facility: CLINIC | Age: 82
End: 2022-10-20

## 2022-10-20 VITALS
OXYGEN SATURATION: 96 % | SYSTOLIC BLOOD PRESSURE: 118 MMHG | HEIGHT: 61 IN | HEART RATE: 69 BPM | DIASTOLIC BLOOD PRESSURE: 72 MMHG | BODY MASS INDEX: 27.19 KG/M2 | WEIGHT: 144 LBS

## 2022-10-20 DIAGNOSIS — I50.22 CHRONIC SYSTOLIC HEART FAILURE: ICD-10-CM

## 2022-10-20 DIAGNOSIS — I42.0 CARDIOMYOPATHY, DILATED: ICD-10-CM

## 2022-10-20 DIAGNOSIS — I49.3 FREQUENT PVCS: Primary | ICD-10-CM

## 2022-10-20 DIAGNOSIS — I25.708 CORONARY ARTERY DISEASE OF BYPASS GRAFT OF NATIVE HEART WITH STABLE ANGINA PECTORIS: ICD-10-CM

## 2022-10-20 DIAGNOSIS — I50.22 CHRONIC SYSTOLIC HEART FAILURE: Primary | ICD-10-CM

## 2022-10-20 DIAGNOSIS — I50.21 ACUTE SYSTOLIC CHF (CONGESTIVE HEART FAILURE): ICD-10-CM

## 2022-10-20 PROCEDURE — 93000 ELECTROCARDIOGRAM COMPLETE: CPT | Performed by: INTERNAL MEDICINE

## 2022-10-20 PROCEDURE — 93284 PRGRMG EVAL IMPLANTABLE DFB: CPT | Performed by: INTERNAL MEDICINE

## 2022-10-20 PROCEDURE — 99213 OFFICE O/P EST LOW 20 MIN: CPT | Performed by: INTERNAL MEDICINE

## 2022-10-20 NOTE — TELEPHONE ENCOUNTER
Patient to stop Amiodarone per Dr. Sánchez. I called patient to update her on this. No answer. I left a voicemail for a return call.

## 2022-10-20 NOTE — PROGRESS NOTES
Estefani Jordan  1940  [phone]    10/20/2022    [facility] CARDIOLOGY [place]     Referring Provider: No ref. provider found     Alda Ayers MD  [address] [facility] [place] [address]    Chief Complaint   Patient presents with   • frequent pvc's     Problem List:   1. Frequent PVCs  1. Status post PVC ablation 2015 with Dr. Caro in [facility].   2. 14-day monitor 6/20/2021: 22% PVC burden, nonsustained ventricular tachycardia noted-longest run 5 beats  3. BIVICD Dr. Sánchez 12/7/2021  2. CAD  a. Status post stenting to the LAD in 2015; EF at that time was 25%.  b. Nuclear stress test 9/2020 OSH no evidence of ischemia.  c. LHC 7/22/2021: LVEF 25%, mod MR No hemodynamically significant, flow-limiting stenoses  3. Chronic systolic heart failure  a. EF 25% in 2015.  b. EF noted to be normal at time of nuclear stress testing 9/2020.  c. EF 7/9/2021 per TTE 21 to 25%.  d. 10/2021 echocardiogram EF 31-35%.  Following segments are hypokinetic: Mid anterior, basal inferior septal, mid inferior septal, mid anterior septal and basal inferior septal.  Moderately dilated LV.  Mild to moderate MR  4. LBBB  5. Hypertension  6. Hyperlipidemia      Allergies  Allergies   Allergen Reactions   • Contrast Dye Rash       Current Medications    Current Outpatient Medications:   •  amiodarone (PACERONE) 200 MG tablet, TAKE 1 TABLET EVERY DAY, Disp: 90 tablet, Rfl: 3  •  aspirin 81 MG EC tablet, Take 81 mg by mouth Daily., Disp: , Rfl:   •  atorvastatin (LIPITOR) 20 MG tablet, Take 20 mg by mouth Daily., Disp: , Rfl:   •  diphenhydrAMINE (BENADRYL) 50 MG capsule, Take 50 mg by mouth Every 6 (Six) Hours As Needed for Itching., Disp: , Rfl:   •  fluticasone (FLONASE) 50 MCG/ACT nasal spray, 2 sprays into the nostril(s) as directed by provider Daily., Disp: , Rfl:   •  furosemide (LASIX) 40 MG tablet, Take 0.5 tablets by mouth Daily., Disp: 90 tablet, Rfl: 3  •  levothyroxine  "(SYNTHROID, LEVOTHROID) 88 MCG tablet, Take 88 mcg by mouth Daily., Disp: , Rfl:   •  metoprolol succinate XL (TOPROL-XL) 25 MG 24 hr tablet, Take 0.5 tablets by mouth Daily., Disp: 45 tablet, Rfl: 3  •  montelukast (SINGULAIR) 10 MG tablet, Take 10 mg by mouth every night at bedtime., Disp: , Rfl:   •  sacubitril-valsartan (Entresto) 24-26 MG tablet, Take 1 tablet by mouth 2 (Two) Times a Day., Disp: 180 tablet, Rfl: 3  •  sertraline (ZOLOFT) 50 MG tablet, Take 50 mg by mouth Daily., Disp: , Rfl:   •  alendronate (FOSAMAX) 70 MG tablet, Take 70 mg by mouth 1 (One) Time Per Week., Disp: , Rfl:   •  cetirizine (zyrTEC) 10 MG tablet, Take 10 mg by mouth Daily., Disp: , Rfl:   •  isosorbide mononitrate (IMDUR) 30 MG 24 hr tablet, Take 1 tablet by mouth Daily., Disp: 30 tablet, Rfl: 11    History of Present Illness     Pt presents for follow up of PVC/CHF/LBBB/VHD/HTN. Since we last saw the pt, pt denies any palpitations, CP, LH, and dizziness. Denies any hospitalizations, ER visits, bleeding, or TIA/CVA symptoms. Overall feels better with more energy and less fatigue.  She does continue to have dyspnea on exertion moderate in severity.  Blood pressures and heart rates been stable at home.    ROS:  General:  Denies fatigue, weight gain or loss  Cardiovascular:  Denies CP, PND, syncope, near syncope, edema or palpitations.  Pulmonary:  + VALENZUELA, no cough, or wheezing      Vitals:    10/20/22 1244   BP: 118/72   BP Location: Left arm   Patient Position: Sitting   Pulse: 69   SpO2: 96%   Weight: 65.3 kg (144 lb)   Height: 154.9 cm (61\")     Body mass index is 27.21 kg/m².  PE:  General: NAD  Neck: no JVD, no carotid bruits, no TM  Heart RRR, NL S1, S2, S4 present, no rubs, murmurs  Lungs: CTA, no wheezes, rhonchi, or rales  Abd: soft, non-tender, NL BS  Ext: No musculoskeletal deformities, no edema, cyanosis, or clubbing  Psych: normal mood and affect    Diagnostic Data:    BiV ICD Manual Interrogation:    BiV ICD Manual " Interrogation: normal function. Less than 1% PVCs. BiV paced 99%.  99% right atrial paced.  Sync AV turned on.      ECG 12 Lead    Date/Time: 10/20/2022 12:54 PM  Performed by: Cortez Sánchez MD  Authorized by: Cortez Sánchez MD   Comparison: compared with previous ECG from 6/20/2022  Similar to previous ECG  Rhythm: sinus rhythm and paced  BPM: 66              1. Frequent PVCs    2. Cardiomyopathy, dilated (HCC)    3. Chronic systolic heart failure (HCC)    4. Coronary artery disease of bypass graft of native heart with stable angina pectoris (HCC)          Plan:  1) Premature ventricular contractions  -s/p PVC RFA with Dr. Chin in 2015. Currently on Amiodarone 200 mg.   - Less than 1% PVCS on device, doing well.   Will discontinue amiodarone at this time.     2) Dilated cardiomyopathy  -Follows with Dr. Wright and currently on Entresto,Toprol-XL,Farxiga, Lasix.   LVEF improved to 35%.  Repeat echocardiogram when she follows up with Dr. Ochoa.  - normal BiV ICD function today.      3) Chronic Systolic CHF: Class 1 to class II CHF symptoms.  -Entresto, Toprol XL, Farxiga.       4) CAD:  -  [facility] 7/22/2021 which showed patent stent and no significant coronary disease  - follows with Dr. Wright       F/up in 6 months

## 2023-01-02 PROCEDURE — 93296 REM INTERROG EVL PM/IDS: CPT | Performed by: INTERNAL MEDICINE

## 2023-01-02 PROCEDURE — 93295 DEV INTERROG REMOTE 1/2/MLT: CPT | Performed by: INTERNAL MEDICINE

## 2023-02-17 RX ORDER — SACUBITRIL AND VALSARTAN 24; 26 MG/1; MG/1
TABLET, FILM COATED ORAL
Qty: 180 TABLET | Refills: 3 | Status: SHIPPED | OUTPATIENT
Start: 2023-02-17

## 2023-05-25 ENCOUNTER — OFFICE VISIT (OUTPATIENT)
Dept: CARDIOLOGY | Facility: CLINIC | Age: 83
End: 2023-05-25
Payer: MEDICARE

## 2023-05-25 ENCOUNTER — HOSPITAL ENCOUNTER (OUTPATIENT)
Dept: CARDIOLOGY | Facility: HOSPITAL | Age: 83
Discharge: HOME | End: 2023-05-25
Admitting: INTERNAL MEDICINE
Payer: MEDICARE

## 2023-05-25 VITALS
HEIGHT: 61 IN | WEIGHT: 144 LBS | DIASTOLIC BLOOD PRESSURE: 79 MMHG | BODY MASS INDEX: 27.19 KG/M2 | SYSTOLIC BLOOD PRESSURE: 139 MMHG

## 2023-05-25 VITALS
WEIGHT: 151 LBS | DIASTOLIC BLOOD PRESSURE: 70 MMHG | OXYGEN SATURATION: 98 % | SYSTOLIC BLOOD PRESSURE: 122 MMHG | HEIGHT: 61 IN | BODY MASS INDEX: 28.51 KG/M2 | HEART RATE: 62 BPM

## 2023-05-25 DIAGNOSIS — I42.0 CARDIOMYOPATHY, DILATED: ICD-10-CM

## 2023-05-25 DIAGNOSIS — I50.21 ACUTE SYSTOLIC CHF (CONGESTIVE HEART FAILURE): ICD-10-CM

## 2023-05-25 DIAGNOSIS — I49.3 FREQUENT PVCS: ICD-10-CM

## 2023-05-25 DIAGNOSIS — I25.708 CORONARY ARTERY DISEASE OF BYPASS GRAFT OF NATIVE HEART WITH STABLE ANGINA PECTORIS: ICD-10-CM

## 2023-05-25 DIAGNOSIS — I50.22 CHRONIC SYSTOLIC HEART FAILURE: Primary | ICD-10-CM

## 2023-05-25 DIAGNOSIS — I10 ESSENTIAL HYPERTENSION: ICD-10-CM

## 2023-05-25 DIAGNOSIS — I50.22 CHRONIC SYSTOLIC HEART FAILURE: ICD-10-CM

## 2023-05-25 PROCEDURE — 93306 TTE W/DOPPLER COMPLETE: CPT | Performed by: INTERNAL MEDICINE

## 2023-05-25 PROCEDURE — 93306 TTE W/DOPPLER COMPLETE: CPT

## 2023-05-25 RX ORDER — CETIRIZINE HYDROCHLORIDE 10 MG/1
10 TABLET ORAL DAILY
COMMUNITY

## 2023-05-25 NOTE — PROGRESS NOTES
[facility] Cardiology   [address], [address]  [place], [place], [address]    [phone]  [email or web address]           OUTPATIENT CLINIC FOLLOW UP NOTE    Patient Care Team:  Patient Care Team:  Alda Ayers MD as PCP - General (Internal Medicine)    Subjective:      Chief Complaint   Patient presents with   • Chronic systolic heart failure       HPI:    Estefani Jordan is a 82 y.o. female.  Problem list:  1. CAD  a. Status post stenting to the LAD in 2015; EF at that time was 25%.  b. Nuclear stress test 9/2020 OSH with normal EF and no evidence of ischemia.  c. LHC 7/22/2021: No hemodynamically significant, flow-limiting stenoses  2. Chronic systolic heart failure, ischemic cardiomyopathy  a. EF 25% in 2015.  b. EF noted to be normal at time of nuclear stress testing 9/2020.  c. EF 7/9/2021 per TTE 21 to 25%.  d. Status post BiV ICD 12/07/2021, Dr. Sánchez  3. Frequent PVCs  a. Status post PVC ablation in 2015.  b. 14-day cardiac monitor 6/2021: 22% PVC burden, NSVT noted with longest being 5 beats.  4. Hypertension  5. Hyperlipidemia    She presents today for follow-up.    Mild, intermittent chest pains have overall improved.  No notable chest pains.  Has had chronic chest pain for years. Unremarkable C 7/2021.      Denies palpitations, shortness of breath, lightheadedness or syncope.  Lower extremity edema also improved.    Review of Systems:  As noted in above HPI.      PFSH:  Patient Active Problem List   Diagnosis   • Coronary arteriosclerosis in native artery   • Chronic systolic heart failure   • Essential hypertension   • Gastroesophageal reflux disease   • History of depression   • Hypercholesterolemia   • Left bundle branch block   • Obstructive sleep apnea syndrome   • Ventricular premature beats   • Acute systolic CHF (congestive heart failure)   • Coronary artery disease of bypass graft of native heart with stable angina pectoris   • Frequent PVCs   •  "Cardiomyopathy, dilated         Current Outpatient Medications:   •  aspirin 81 MG EC tablet, Take 1 tablet by mouth Daily., Disp: , Rfl:   •  atorvastatin (LIPITOR) 20 MG tablet, Take 1 tablet by mouth Daily., Disp: , Rfl:   •  cetirizine (zyrTEC) 10 MG tablet, Take 1 tablet by mouth Daily., Disp: , Rfl:   •  Entresto 24-26 MG tablet, TAKE 1 TABLET TWICE DAILY, Disp: 180 tablet, Rfl: 3  •  furosemide (LASIX) 40 MG tablet, Take 0.5 tablets by mouth Daily., Disp: 90 tablet, Rfl: 3  •  levothyroxine (SYNTHROID, LEVOTHROID) 88 MCG tablet, Take 1 tablet by mouth Daily., Disp: , Rfl:   •  metoprolol succinate XL (TOPROL-XL) 25 MG 24 hr tablet, Take 0.5 tablets by mouth Daily., Disp: 45 tablet, Rfl: 3  •  metroNIDAZOLE (METROCREAM) 0.75 % cream, , Disp: , Rfl:   •  montelukast (SINGULAIR) 10 MG tablet, Take 1 tablet by mouth every night at bedtime., Disp: , Rfl:   •  sertraline (ZOLOFT) 50 MG tablet, Take 1 tablet by mouth Daily., Disp: , Rfl:     Allergies   Allergen Reactions   • Contrast Dye (Echo Or Unknown Ct/Mr) Rash        reports that she has never smoked. She has been exposed to tobacco smoke. She has never used smokeless tobacco.      Objective:   Physical exam:  /70 (BP Location: Left arm, Patient Position: Sitting)   Pulse 62   Ht 154.9 cm (61\")   Wt 68.5 kg (151 lb)   SpO2 98%   BMI 28.53 kg/m²   CONSTITUTIONAL: No acute distress  RESPIRATORY: Normal effort. Clear to auscultation bilaterally without wheezing or rales  CARDIOVASCULAR: Carotids with normal upstrokes without bruits.  Regular rate and rhythm with normal S1 and S2. Without murmur, gallop or rub. Normal left radial pulse. There is no lower extremity edema bilaterally.    Labs:      Lab Results   Component Value Date    CHOL 144 07/22/2021     Lab Results   Component Value Date    TRIG 49 07/22/2021     Lab Results   Component Value Date    HDL 61 (H) 07/22/2021     Lab Results   Component Value Date    LDL 72 07/22/2021     [facility] components " [facility]    Diagnostic Data:    Procedures    Results for orders placed during the hospital encounter of 10/11/21    Adult Transthoracic Echo Limited W/ Cont if Necessary Per Protocol    Interpretation Summary  · Left ventricular ejection fraction appears to be 31 - 35%. Left ventricular systolic function is moderately decreased.  · The following left ventricular wall segments are hypokinetic: mid anterior, basal inferoseptal, mid inferoseptal, mid anteroseptal and basal inferoseptal.  · The left ventricular cavity is moderately dilated.  · Mild to moderate mitral valve regurgitation.    [facility] 7/22/2021  · There are no hemodynamically significant, flow-limiting stenoses.  The previously placed stent in the LAD is widely patent.  · Severely decreased left ventricular ejection fraction 20 to 25% with global hypokinesis, a dilated left ventricle, and at least moderate mitral regurgitation.  Elevated LVEDP of 20 mmHg    Holter monitor 6/7/2021  · PVC burden 22%. The 6 patient events were associated with PVCs. NSVT present, longest only 5 beats.  · Findings already discussed with the ordering provider, ROSALBA Guidry.      Assessment and Plan:     Frequent PVCs   Left bundle branch block  Relative bradycardia  -Prior PVC ablation in 2015.  -Continue EP follow-up, metoprolol    Coronary artery disease  Chronic systolic heart failure, nonischemic  Mitral regurgitation  -EF improved from 20-25% to 30-35% in 10/2021, Status post Biv ICD with Dr. Sánchez, 12/2021.  -No significant flow-limiting stenosis noted on cardiac catheterization 7/22/2021    -Repeat echo results pending.  We will call patient with results    -Continue Entresto, beta-blocker, Imdur  -Possibly had renal insufficiency related to Farxiga.  -Continue Lasix at lower dose of 20 mg daily, with an additional 20 mg as needed.      Essential hypertension  -Continue current related medications.      - Return in about 1 year (around 5/25/2024) for Next  scheduled follow-up with an ECG.       Yobany Wright MD, MSc, FACC, [facility]  Interventional Cardiology  [facility]

## 2023-05-26 LAB
BH CV ECHO MEAS - AO MAX PG: 6.9 MMHG
BH CV ECHO MEAS - AO MEAN PG: 3.5 MMHG
BH CV ECHO MEAS - AO ROOT DIAM: 3.2 CM
BH CV ECHO MEAS - AO V2 MAX: 131 CM/SEC
BH CV ECHO MEAS - AO V2 VTI: 33.6 CM
BH CV ECHO MEAS - AVA(I,D): 1.87 CM2
BH CV ECHO MEAS - EDV(CUBED): 128.8 ML
BH CV ECHO MEAS - EDV(MOD-SP2): 81.7 ML
BH CV ECHO MEAS - EDV(MOD-SP4): 91 ML
BH CV ECHO MEAS - EF(MOD-BP): 50.6 %
BH CV ECHO MEAS - EF(MOD-SP2): 49.2 %
BH CV ECHO MEAS - EF(MOD-SP4): 52.3 %
BH CV ECHO MEAS - ESV(CUBED): 76.8 ML
BH CV ECHO MEAS - ESV(MOD-SP2): 41.5 ML
BH CV ECHO MEAS - ESV(MOD-SP4): 43.4 ML
BH CV ECHO MEAS - FS: 15.8 %
BH CV ECHO MEAS - IVS/LVPW: 0.95 CM
BH CV ECHO MEAS - IVSD: 1.2 CM
BH CV ECHO MEAS - LA DIMENSION: 4.1 CM
BH CV ECHO MEAS - LAT PEAK E' VEL: 5.2 CM/SEC
BH CV ECHO MEAS - LV DIASTOLIC VOL/BSA (35-75): 55.4 CM2
BH CV ECHO MEAS - LV MASS(C)D: 204 GRAMS
BH CV ECHO MEAS - LV MAX PG: 1.68 MMHG
BH CV ECHO MEAS - LV MEAN PG: 1 MMHG
BH CV ECHO MEAS - LV SYSTOLIC VOL/BSA (12-30): 26.4 CM2
BH CV ECHO MEAS - LV V1 MAX: 64.7 CM/SEC
BH CV ECHO MEAS - LV V1 VTI: 16.5 CM
BH CV ECHO MEAS - LVIDD: 4.9 CM
BH CV ECHO MEAS - LVIDS: 4.3 CM
BH CV ECHO MEAS - LVOT AREA: 3.8 CM2
BH CV ECHO MEAS - LVOT DIAM: 2.2 CM
BH CV ECHO MEAS - LVPWD: 1.2 CM
BH CV ECHO MEAS - MED PEAK E' VEL: 6.3 CM/SEC
BH CV ECHO MEAS - MR MAX PG: 109 MMHG
BH CV ECHO MEAS - MR MAX VEL: 522 CM/SEC
BH CV ECHO MEAS - MR MEAN PG: 72 MMHG
BH CV ECHO MEAS - MR MEAN VEL: 399 CM/SEC
BH CV ECHO MEAS - MR VTI: 248 CM
BH CV ECHO MEAS - MV A MAX VEL: 86.5 CM/SEC
BH CV ECHO MEAS - MV DEC SLOPE: 408 CM/SEC2
BH CV ECHO MEAS - MV DEC TIME: 0.24 MSEC
BH CV ECHO MEAS - MV E MAX VEL: 74.9 CM/SEC
BH CV ECHO MEAS - MV E/A: 0.87
BH CV ECHO MEAS - MV P1/2T: 68.1 MSEC
BH CV ECHO MEAS - MVA(P1/2T): 3.2 CM2
BH CV ECHO MEAS - PA ACC TIME: 0.16 SEC
BH CV ECHO MEAS - PA PR(ACCEL): 8.6 MMHG
BH CV ECHO MEAS - PA V2 MAX: 91.1 CM/SEC
BH CV ECHO MEAS - RAP SYSTOLE: 3 MMHG
BH CV ECHO MEAS - RVSP: 22 MMHG
BH CV ECHO MEAS - SI(MOD-SP2): 24.5 ML/M2
BH CV ECHO MEAS - SI(MOD-SP4): 29 ML/M2
BH CV ECHO MEAS - SV(LVOT): 62.7 ML
BH CV ECHO MEAS - SV(MOD-SP2): 40.2 ML
BH CV ECHO MEAS - SV(MOD-SP4): 47.6 ML
BH CV ECHO MEAS - TAPSE (>1.6): 1.6 CM
BH CV ECHO MEAS - TR MAX PG: 19 MMHG
BH CV ECHO MEAS - TR MAX VEL: 204.3 CM/SEC
BH CV ECHO MEASUREMENTS AVERAGE E/E' RATIO: 13.03
BH CV XLRA - RV BASE: 3.5 CM
BH CV XLRA - RV LENGTH: 7.6 CM
BH CV XLRA - RV MID: 2.5 CM
BH CV XLRA - TDI S': 10.8 CM/SEC
LEFT ATRIUM VOLUME INDEX: 41 ML/M2
LV EF 2D ECHO EST: 50 %
MAXIMAL PREDICTED HEART RATE: 140 BPM
STRESS TARGET HR: 119 BPM

## 2023-08-07 RX ORDER — METOPROLOL SUCCINATE 25 MG/1
TABLET, EXTENDED RELEASE ORAL
Qty: 45 TABLET | Refills: 3 | Status: SHIPPED | OUTPATIENT
Start: 2023-08-07

## 2023-11-16 ENCOUNTER — OFFICE VISIT (OUTPATIENT)
Dept: CARDIOLOGY | Facility: CLINIC | Age: 83
End: 2023-11-16
Payer: MEDICARE

## 2023-11-16 VITALS
DIASTOLIC BLOOD PRESSURE: 64 MMHG | WEIGHT: 142 LBS | SYSTOLIC BLOOD PRESSURE: 110 MMHG | HEART RATE: 70 BPM | HEIGHT: 61 IN | OXYGEN SATURATION: 95 % | BODY MASS INDEX: 26.81 KG/M2

## 2023-11-16 DIAGNOSIS — I25.10 CORONARY ARTERIOSCLEROSIS IN NATIVE ARTERY: ICD-10-CM

## 2023-11-16 DIAGNOSIS — I42.0 CARDIOMYOPATHY, DILATED: ICD-10-CM

## 2023-11-16 DIAGNOSIS — I50.22 CHRONIC SYSTOLIC HEART FAILURE: ICD-10-CM

## 2023-11-16 DIAGNOSIS — I49.3 FREQUENT PVCS: Primary | ICD-10-CM

## 2023-11-16 NOTE — PROGRESS NOTES
Estefani Jordan  1940  [phone]    11/16/2023    [facility] CARDIOLOGY     Referring Provider: No ref. provider found     Alda Ayers MD  [address] [address]    Chief Complaint   Patient presents with    Frequent PVCs         Problem List:     Frequent PVCs  Status post PVC ablation 2015 with Dr. Caro in [facility].   14-day monitor 6/20/2021: 22% PVC burden, nonsustained ventricular tachycardia noted-longest run 5 beats  BIVICD Dr. Sánchez 12/7/2021  CAD  Status post stenting to the LAD in 2015; EF at that time was 25%.  Nuclear stress test 9/2020 OSH no evidence of ischemia.  LHC 7/22/2021: LVEF 25%, mod MR No hemodynamically significant, flow-limiting stenoses  Chronic systolic heart failure  EF 25% in 2015.  EF noted to be normal at time of nuclear stress testing 9/2020.  EF 7/9/2021 per TTE 21 to 25%.  August 2021 echocardiogram EF 31-35%.  Following segments are hypokinetic: Mid anterior, basal inferior septal, mid inferior septal, mid anterior septal and basal inferior septal.  Moderately dilated LV.  Mild to moderate MR  Echocardiogram 5/2023: EF 50%.LA cavity is mild to moderately dilated, mild to moderate MR, mild TR.  LBBB  Hypertension  Hyperlipidemia    Allergies  Allergies   Allergen Reactions    Contrast Dye (Echo Or Unknown Ct/Mr) Rash       Current Medications    Current Outpatient Medications:     aspirin 81 MG EC tablet, Take 1 tablet by mouth Daily., Disp: , Rfl:     atorvastatin (LIPITOR) 20 MG tablet, Take 1 tablet by mouth Daily., Disp: , Rfl:     cetirizine (zyrTEC) 10 MG tablet, Take 1 tablet by mouth Daily., Disp: , Rfl:     Entresto 24-26 MG tablet, TAKE 1 TABLET TWICE DAILY, Disp: 180 tablet, Rfl: 3    furosemide (LASIX) 40 MG tablet, Take 0.5 tablets by mouth Daily., Disp: 90 tablet, Rfl: 3    levothyroxine (SYNTHROID, LEVOTHROID) 88 MCG tablet, Take 1 tablet by mouth Daily., Disp: , Rfl:     metoprolol  "succinate XL (TOPROL-XL) 25 MG 24 hr tablet, TAKE 1/2 TABLET EVERY DAY, Disp: 45 tablet, Rfl: 3    metroNIDAZOLE (METROCREAM) 0.75 % cream, , Disp: , Rfl:     montelukast (SINGULAIR) 10 MG tablet, Take 1 tablet by mouth every night at bedtime., Disp: , Rfl:     sertraline (ZOLOFT) 50 MG tablet, Take 1 tablet by mouth Daily., Disp: , Rfl:     History of Present Illness     Pt presents for follow up of CHF, PVCs, BIV ICD check, and HTN. Since we last saw the pt, pt denies any significant palpitations,  SOB, CP, LH, and dizziness. She was in the ER a couple of months ago, due to fluid on her lungs, but her diuretic was adjusted and she has improved. She is now taking it every other day and extra if she needs it.  She states she is overall doing well. Denies any hospitalizations,  bleeding, or TIA/CVA symptoms.     ROS:  General:  Denies fatigue, weight gain or loss  Cardiovascular:  Denies CP, PND, syncope, near syncope, edema or palpitations.  Pulmonary:  Denies VALENZUELA, cough, or wheezing      Vitals:    11/16/23 1239   BP: 110/64   BP Location: Right arm   Patient Position: Sitting   Pulse: 70   SpO2: 95%   Weight: 64.4 kg (142 lb)   Height: 154.9 cm (61\")     Body mass index is 26.83 kg/m².  PE:  General: NAD  Neck: no JVD, no carotid bruits, no TM  Heart RRR, NL S1, S2, S4 present, no rubs, murmurs  Lungs: CTA, no wheezes, rhonchi, or rales  Abd: soft, non-tender, NL BS  Ext: No musculoskeletal deformities, no edema, cyanosis, or clubbing  Psych: normal mood and affect    Diagnostic Data:    BiV ICD: RA paced 85%, RV paced 95%. Battery 5.5-5.8 years on battery. 1.4% PVCs. No AFIB.     Procedures         1. Frequent PVCs    2. Cardiomyopathy, dilated    3. Chronic systolic heart failure    4. Coronary arteriosclerosis in native artery          Plan:  1) Premature ventricular contractions  -s/p PVC RFA with Dr. Chin in 2015.   - 1.4% PVCS on device, doing well.   - off Amiodarone since 10/2022       2) Dilated " cardiomyopathy  -Follows with Dr. Wright and currently on Entresto,Toprol-XL, Lasix.  No Farxiga due to possible RAJESH.  LVEF improved to 50%     - Echocardiogram 5/25/2023: 50%, LA cavity is mild to moderately dilated, mild to moderate MR, mild TR.   - normal BiV ICD function today.      3) Chronic Systolic CHF: Class 1 to class II CHF symptoms.  -Entresto, Toprol XL, Farxiga.        4) CAD:  -  [facility] 7/22/2021 which showed patent stent and no significant coronary disease  - follows with Dr. Wright    F/up in 6 months    Electronically signed by TONO Henry, 11/16/23, 12:57 PM EST.

## 2024-02-05 DIAGNOSIS — I50.22 CHRONIC SYSTOLIC HEART FAILURE: ICD-10-CM

## 2024-02-06 RX ORDER — FUROSEMIDE 40 MG/1
20 TABLET ORAL DAILY
Qty: 90 TABLET | Refills: 3
Start: 2024-02-06

## 2024-02-24 DIAGNOSIS — I50.22 CHRONIC SYSTOLIC HEART FAILURE: ICD-10-CM

## 2024-02-26 RX ORDER — SACUBITRIL AND VALSARTAN 24; 26 MG/1; MG/1
TABLET, FILM COATED ORAL
Qty: 180 TABLET | Refills: 3 | Status: SHIPPED | OUTPATIENT
Start: 2024-02-26

## 2024-02-26 RX ORDER — FUROSEMIDE 40 MG/1
20 TABLET ORAL DAILY
Qty: 90 TABLET | Refills: 3
Start: 2024-02-26 | End: 2024-02-27 | Stop reason: SDUPTHER

## 2024-02-27 RX ORDER — FUROSEMIDE 40 MG/1
20 TABLET ORAL DAILY
Qty: 90 TABLET | Refills: 0 | Status: SHIPPED | OUTPATIENT
Start: 2024-02-27

## 2024-06-20 ENCOUNTER — OFFICE VISIT (OUTPATIENT)
Dept: CARDIOLOGY | Facility: CLINIC | Age: 84
End: 2024-06-20
Payer: MEDICARE

## 2024-06-20 VITALS
SYSTOLIC BLOOD PRESSURE: 122 MMHG | DIASTOLIC BLOOD PRESSURE: 76 MMHG | BODY MASS INDEX: 25.76 KG/M2 | OXYGEN SATURATION: 98 % | WEIGHT: 140 LBS | HEART RATE: 64 BPM | HEIGHT: 62 IN

## 2024-06-20 DIAGNOSIS — I42.0 CARDIOMYOPATHY, DILATED: ICD-10-CM

## 2024-06-20 DIAGNOSIS — I10 ESSENTIAL HYPERTENSION: ICD-10-CM

## 2024-06-20 DIAGNOSIS — I50.22 CHRONIC SYSTOLIC HEART FAILURE: ICD-10-CM

## 2024-06-20 DIAGNOSIS — I25.708 CORONARY ARTERY DISEASE OF BYPASS GRAFT OF NATIVE HEART WITH STABLE ANGINA PECTORIS: ICD-10-CM

## 2024-06-20 DIAGNOSIS — I49.3 FREQUENT PVCS: Primary | ICD-10-CM

## 2024-06-20 NOTE — PROGRESS NOTES
Estefani Jordan  1940  [phone]      06/20/2024    [facility] CARDIOLOGY     Alda Ayers MD  [address] [id number]    Chief Complaint   Patient presents with    Frequent PVCs       Problem List:     Frequent PVCs  Status post PVC ablation 2015 with Dr. Caro in [facility].   14-day monitor 6/20/2021: 22% PVC burden, nonsustained ventricular tachycardia noted-longest run 5 beats  BIVICD Dr. Sánchez 12/7/2021  CAD  Status post stenting to the LAD in 2015; EF at that time was 25%.  Nuclear stress test 9/2020 OSH no evidence of ischemia.  LHC 7/22/2021: LVEF 25%, mod MR No hemodynamically significant, flow-limiting stenoses  Chronic systolic heart failure  EF 25% in 2015.  EF noted to be normal at time of nuclear stress testing 9/2020.  EF 7/9/2021 per TTE 21 to 25%.  August 2021 echocardiogram EF 31-35%.  Following segments are hypokinetic: Mid anterior, basal inferior septal, mid inferior septal, mid anterior septal and basal inferior septal.  Moderately dilated LV.  Mild to moderate MR  Echocardiogram 5/2023: EF 50%.LA cavity is mild to moderately dilated, mild to moderate MR, mild TR.  LBBB  Hypertension  Hyperlipidemia  Allergies  Allergies   Allergen Reactions    Contrast Dye (Echo Or Unknown Ct/Mr) Rash       Current Medications    Current Outpatient Medications:     aspirin 81 MG EC tablet, Take 1 tablet by mouth Daily., Disp: , Rfl:     atorvastatin (LIPITOR) 20 MG tablet, Take 1 tablet by mouth Daily., Disp: , Rfl:     cetirizine (zyrTEC) 10 MG tablet, Take 1 tablet by mouth Daily., Disp: , Rfl:     Entresto 24-26 MG tablet, TAKE 1 TABLET TWICE DAILY, Disp: 180 tablet, Rfl: 3    furosemide (LASIX) 40 MG tablet, Take 0.5 tablets by mouth Daily., Disp: 90 tablet, Rfl: 0    levothyroxine (SYNTHROID, LEVOTHROID) 88 MCG tablet, Take 1 tablet by mouth Daily., Disp: , Rfl:     metoprolol succinate XL (TOPROL-XL) 25 MG 24 hr tablet, TAKE 1/2  "TABLET EVERY DAY, Disp: 45 tablet, Rfl: 3    metroNIDAZOLE (METROCREAM) 0.75 % cream, , Disp: , Rfl:     montelukast (SINGULAIR) 10 MG tablet, Take 1 tablet by mouth every night at bedtime., Disp: , Rfl:     sertraline (ZOLOFT) 50 MG tablet, Take 1 tablet by mouth Daily., Disp: , Rfl:     History of Present Illness     Pt presents for follow up of PVC/VT/HTN/CHF. Since the pt has seen us, pt has overall done reasonably well.  She has no hospitalizations or chest pain.  She does have chronic dyspnea on exertion with moderate exertion activity which is unchanged.  She has noted intermittent lower extremity edema at times and has recently gained 5 or 6 pounds.  No near-syncope or syncope.   Denies any hospitalizations, ER visits, ICD shocks, or TIA/CVA symptoms.  Blood pressures are well-controlled at home.  She has been compliant with her medical therapy.        Vitals:    06/20/24 1319   BP: 122/76   BP Location: Left arm   Patient Position: Sitting   Pulse: 64   SpO2: 98%   Weight: 63.5 kg (140 lb)   Height: 157.5 cm (62\")       PE:  General: NAD  Neck: no JVD, no carotid bruits, no TM  Heart RRR, NL S1, S2, S4 present, no rubs, murmurs  Lungs: CTA, no wheezes, rhonchi, or rales  Abd: soft, non-tender, NL BS  Ext: No musculoskeletal deformities, no edema, cyanosis, or clubbing  Psych: normal mood and affect    Diagnostic Data:  Procedures.    Irrigation of her BiV ICD: Normal biventricular pacemaker ICD function.  RA paced 61%, RV paced 90%. Battery 5.1 years on battery. 4% PVCs. No AFIB.  No VT.      1. Frequent PVCs    2. Chronic systolic heart failure    3. Cardiomyopathy, dilated    4. Coronary artery disease of bypass graft of native heart with stable angina pectoris    5. Essential hypertension          Plan:    1) Premature ventricular contractions  -s/p PVC RFA with Dr. Chin in 2015.   - 4% PVCS on device, doing well.   - off Amiodarone since 10/2022; continue to monitor via ICD.        2) Dilated " cardiomyopathy  -Follows with Dr. Wright and currently on Entresto,Toprol-XL, Lasix.  No Farxiga due to possible RAJESH.  LVEF improved to 50%     - Echocardiogram 5/25/2023: 50%, LA cavity is mild to moderately dilated, mild to moderate MR, mild TR.   - normal BiV ICD function today.  Told the patient to increase her Lasix to 40 mg a day for 3 days then back to 20 mg a day when she does gain significant amount of fluid.  Follow-up with Dr. Wright     3) Chronic Systolic CHF: Class 1 to class II CHF symptoms.  -Entresto, Toprol XL, Farxiga.     4) CAD:  -  LHC 7/22/2021 which showed patent stent and no significant coronary disease  - follows with Dr. Wright    5) HTN: Presently well-controlled.      F/up in 6 months

## 2024-07-29 ENCOUNTER — OFFICE VISIT (OUTPATIENT)
Dept: CARDIOLOGY | Facility: CLINIC | Age: 84
End: 2024-07-29
Payer: MEDICARE

## 2024-07-29 VITALS
WEIGHT: 152 LBS | BODY MASS INDEX: 27.97 KG/M2 | HEART RATE: 62 BPM | HEIGHT: 62 IN | SYSTOLIC BLOOD PRESSURE: 114 MMHG | OXYGEN SATURATION: 98 % | DIASTOLIC BLOOD PRESSURE: 58 MMHG

## 2024-07-29 DIAGNOSIS — I50.22 CHRONIC SYSTOLIC HEART FAILURE: ICD-10-CM

## 2024-07-29 DIAGNOSIS — I42.0 CARDIOMYOPATHY, DILATED: ICD-10-CM

## 2024-07-29 DIAGNOSIS — E78.00 HYPERCHOLESTEROLEMIA: ICD-10-CM

## 2024-07-29 DIAGNOSIS — I10 ESSENTIAL HYPERTENSION: ICD-10-CM

## 2024-07-29 DIAGNOSIS — I25.708 CORONARY ARTERY DISEASE OF BYPASS GRAFT OF NATIVE HEART WITH STABLE ANGINA PECTORIS: Primary | ICD-10-CM

## 2024-07-29 PROCEDURE — 99213 OFFICE O/P EST LOW 20 MIN: CPT | Performed by: HOSPITALIST

## 2024-07-29 PROCEDURE — 1160F RVW MEDS BY RX/DR IN RCRD: CPT | Performed by: HOSPITALIST

## 2024-07-29 PROCEDURE — 1159F MED LIST DOCD IN RCRD: CPT | Performed by: HOSPITALIST

## 2024-07-29 PROCEDURE — 3078F DIAST BP <80 MM HG: CPT | Performed by: HOSPITALIST

## 2024-07-29 PROCEDURE — 3074F SYST BP LT 130 MM HG: CPT | Performed by: HOSPITALIST

## 2024-07-29 NOTE — PROGRESS NOTES
[facility] Cardiology   [address], [address]  [place], [place], [address]    [phone]  WWW.[facility]           OUTPATIENT CLINIC FOLLOW UP NOTE    Patient Care Team:  Patient Care Team:  Alda Ayers MD as PCP - General (Internal Medicine)  Yobany Wright MD as Consulting Physician (Cardiology)    Subjective:      Chief Complaint   Patient presents with    Chronic systolic heart failure       HPI:    Estefani Jordan is a 83 y.o. female.  Problem list:  CAD  Status post stenting to the LAD in 2015; EF at that time was 25%.  Nuclear stress test 9/2020 OSH with normal EF and no evidence of ischemia.  C 7/22/2021: No hemodynamically significant, flow-limiting stenoses  Chronic systolic heart failure, ischemic cardiomyopathy  EF 25% in 2015.  EF noted to be normal at time of nuclear stress testing 9/2020.  EF 7/9/2021 per TTE 21 to 25%.  Status post BiV ICD 12/07/2021, Dr. Sánchez  Frequent PVCs  Status post PVC ablation in 2015.  14-day cardiac monitor 6/2021: 22% PVC burden, NSVT noted with longest being 5 beats.  Hypertension  Hyperlipidemia    She presents today for follow-up.  Stable from a cardiovascular standpoint since her last visit.  Stable, chronic chest pain, dyspnea and fatigue.  Continues to have edema that is typically fairly well-controlled with Lasix.  Takes an extra dose 1 to 2 days a week.    Review of Systems:  As noted in above HPI.      PFSH:  Patient Active Problem List   Diagnosis    Coronary arteriosclerosis in native artery    Chronic systolic heart failure    Essential hypertension    Gastroesophageal reflux disease    History of depression    Hypercholesterolemia    Left bundle branch block    Obstructive sleep apnea syndrome    Ventricular premature beats    Acute systolic CHF (congestive heart failure)    Coronary artery disease of bypass graft of native heart with stable angina pectoris    Frequent PVCs    Cardiomyopathy, dilated  "        Current Outpatient Medications:     aspirin 81 MG EC tablet, Take 1 tablet by mouth Daily., Disp: , Rfl:     atorvastatin (LIPITOR) 20 MG tablet, Take 1 tablet by mouth Daily., Disp: , Rfl:     cetirizine (zyrTEC) 10 MG tablet, Take 1 tablet by mouth Daily., Disp: , Rfl:     Entresto 24-26 MG tablet, TAKE 1 TABLET TWICE DAILY, Disp: 180 tablet, Rfl: 3    furosemide (LASIX) 40 MG tablet, Take 0.5 tablets by mouth Daily., Disp: 90 tablet, Rfl: 0    levothyroxine (SYNTHROID, LEVOTHROID) 88 MCG tablet, Take 1 tablet by mouth Daily., Disp: , Rfl:     metoprolol succinate XL (TOPROL-XL) 25 MG 24 hr tablet, TAKE 1/2 TABLET EVERY DAY, Disp: 45 tablet, Rfl: 3    metroNIDAZOLE (METROCREAM) 0.75 % cream, , Disp: , Rfl:     montelukast (SINGULAIR) 10 MG tablet, Take 1 tablet by mouth every night at bedtime., Disp: , Rfl:     sertraline (ZOLOFT) 50 MG tablet, Take 1 tablet by mouth Daily., Disp: , Rfl:     Allergies   Allergen Reactions    Contrast Dye (Echo Or Unknown Ct/Mr) Rash        reports that she has never smoked. She has been exposed to tobacco smoke. She has never used smokeless tobacco.      Objective:   Physical exam:  /58 (BP Location: Left arm, Patient Position: Sitting, Cuff Size: Adult)   Pulse 62   Ht 157.5 cm (62\")   Wt 68.9 kg (152 lb)   SpO2 98%   BMI 27.80 kg/m²   CONSTITUTIONAL: No acute distress  RESPIRATORY: Normal effort. Clear to auscultation bilaterally without wheezing or rales  CARDIOVASCULAR: Carotids with normal upstrokes without bruits.  Regular rate and rhythm with normal S1 and S2. Without murmur, gallop or rub. Normal left radial pulse. There is no lower extremity edema bilaterally.    Labs:      Lab Results   Component Value Date    CHOL 144 07/22/2021     Lab Results   Component Value Date    TRIG 49 07/22/2021     Lab Results   Component Value Date    HDL 61 (H) 07/22/2021     Lab Results   Component Value Date    LDL 72 07/22/2021     No components found for: " "\"LDLDIRECTC\"    Diagnostic Data:    Procedures    Results for orders placed during the hospital encounter of 05/25/23    Adult Transthoracic Echo Complete W/ Cont if Necessary Per Protocol    Interpretation Summary    Left ventricular systolic function is low normal. Estimated left ventricular EF = 50%    The left ventricular cavity is mildly dilated.    Left ventricular wall thickness is consistent with mild concentric hypertrophy.    The following left ventricular wall segments are hypokinetic: basal anterolateral, mid anterolateral, basal inferolateral, mid inferolateral and basal anterior.    Left ventricular diastolic function is consistent with (grade II w/high LAP) pseudonormalization.    The left atrial cavity is mild to moderately dilated.    There is mild calcification of the aortic valve.    Mild to moderate mitral valve regurgitation is present.    Mild tricuspid valve regurgitation is present.    [facility] 7/22/2021  There are no hemodynamically significant, flow-limiting stenoses.  The previously placed stent in the LAD is widely patent.  Severely decreased left ventricular ejection fraction 20 to 25% with global hypokinesis, a dilated left ventricle, and at least moderate mitral regurgitation.  Elevated LVEDP of 20 mmHg    Holter monitor 6/7/2021  PVC burden 22%. The 6 patient events were associated with PVCs. NSVT present, longest only 5 beats.  Findings already discussed with the ordering provider, ROSALBA Guidry.      Assessment and Plan:     Frequent PVCs   Left bundle branch block  Relative bradycardia  -Prior PVC ablation in 2015.  -Continue EP follow-up, metoprolol    Coronary artery disease  Chronic systolic heart failure, nonischemic  Mitral regurgitation  -EF improved from 20-25% to 30-35% in 10/2021, Status post Biv ICD with Dr. Sánchez, 12/2021.  -No significant flow-limiting stenosis noted on cardiac catheterization 7/22/2021  -Most recent echo 5/2023 showed improvement in EF to 50%. "     -Continue Entresto, beta-blocker, Imdur  -Possibly had renal insufficiency related to Farxiga.  -Continue Lasix at lower dose of 20 mg daily, with an additional 20 mg as needed.    -Upcoming appointment with PCP for yearly labs.     Essential hypertension  -Continue current related medications.      - Return in about 1 year (around 7/29/2025) for Next scheduled follow up with EKG. .     Electronically signed by ROSALBA Morrison, 07/29/24, 3:30 PM EDT.

## 2024-08-19 RX ORDER — METOPROLOL SUCCINATE 25 MG/1
TABLET, EXTENDED RELEASE ORAL
Qty: 45 TABLET | Refills: 3 | Status: SHIPPED | OUTPATIENT
Start: 2024-08-19

## 2024-10-30 PROCEDURE — 93296 REM INTERROG EVL PM/IDS: CPT | Performed by: INTERNAL MEDICINE

## 2024-10-30 PROCEDURE — 93295 DEV INTERROG REMOTE 1/2/MLT: CPT | Performed by: INTERNAL MEDICINE

## 2025-01-16 ENCOUNTER — OFFICE VISIT (OUTPATIENT)
Dept: CARDIOLOGY | Facility: CLINIC | Age: 85
End: 2025-01-16
Payer: MEDICARE

## 2025-01-16 VITALS
OXYGEN SATURATION: 99 % | BODY MASS INDEX: 27.75 KG/M2 | SYSTOLIC BLOOD PRESSURE: 124 MMHG | DIASTOLIC BLOOD PRESSURE: 70 MMHG | WEIGHT: 147 LBS | HEART RATE: 74 BPM | HEIGHT: 61 IN

## 2025-01-16 DIAGNOSIS — I50.22 CHRONIC SYSTOLIC HEART FAILURE: ICD-10-CM

## 2025-01-16 DIAGNOSIS — I49.3 VENTRICULAR PREMATURE BEATS: Primary | ICD-10-CM

## 2025-01-16 DIAGNOSIS — I25.708 CORONARY ARTERY DISEASE OF BYPASS GRAFT OF NATIVE HEART WITH STABLE ANGINA PECTORIS: ICD-10-CM

## 2025-01-16 DIAGNOSIS — I42.0 CARDIOMYOPATHY, DILATED: ICD-10-CM

## 2025-01-16 NOTE — PROGRESS NOTES
Estefani Jordan  1940  [phone]      01/16/2025    [facility] CARDIOLOGY     Alda Ayers MD  [address] [id number]    Chief Complaint   Patient presents with    Frequent PVCs       Problem List:      Frequent PVCs  Status post PVC ablation 2015 with Dr. Caro in [facility].   14-day monitor 6/20/2021: 22% PVC burden, nonsustained ventricular tachycardia noted-longest run 5 beats  BIVICD Dr. Sánchez 12/7/2021  CAD  Status post stenting to the LAD in 2015; EF at that time was 25%.  Nuclear stress test 9/2020 OSH no evidence of ischemia.  [facility] 7/22/2021: LVEF 25%, mod MR No hemodynamically significant, flow-limiting stenoses  Chronic systolic heart failure  EF 25% in 2015.  EF noted to be normal at time of nuclear stress testing 9/2020.  EF 7/9/2021 per TTE 21 to 25%.  August 2021 echocardiogram EF 31-35%.  Following segments are hypokinetic: Mid anterior, basal inferior septal, mid inferior septal, mid anterior septal and basal inferior septal.  Moderately dilated LV.  Mild to moderate MR  Echocardiogram 5/2023: EF 50%.LA cavity is mild to moderately dilated, mild to moderate MR, mild TR.  LBBB  Hypertension  Hyperlipidemia      Allergies  Allergies   Allergen Reactions    Contrast Dye (Echo Or Unknown Ct/Mr) Rash       Current Medications    Current Outpatient Medications:     aspirin 81 MG EC tablet, Take 1 tablet by mouth Daily., Disp: , Rfl:     atorvastatin (LIPITOR) 20 MG tablet, Take 1 tablet by mouth Daily., Disp: , Rfl:     cetirizine (zyrTEC) 10 MG tablet, Take 1 tablet by mouth Daily., Disp: , Rfl:     Entresto 24-26 MG tablet, TAKE 1 TABLET TWICE DAILY, Disp: 180 tablet, Rfl: 3    levothyroxine (SYNTHROID, LEVOTHROID) 88 MCG tablet, Take 1 tablet by mouth Daily., Disp: , Rfl:     metoprolol succinate XL (TOPROL-XL) 25 MG 24 hr tablet, TAKE 1/2 TABLET EVERY DAY, Disp: 45 tablet, Rfl: 3    metroNIDAZOLE (METROCREAM) 0.75 % cream, ,  "Disp: , Rfl:     montelukast (SINGULAIR) 10 MG tablet, Take 1 tablet by mouth every night at bedtime., Disp: , Rfl:     sertraline (ZOLOFT) 50 MG tablet, Take 1 tablet by mouth Daily., Disp: , Rfl:     History of Present Illness     Pt presents for follow up of PVC/CHF/CAD/LBBB/HTN. Since the pt has seen us, pt denies any significant palpitations, SOB, CP, LH, and dizziness. Denies any hospitalizations, ER visits, ICD shocks, or TIA/CVA symptoms. No side effects to medications.  Blood pressure been well-controlled.  Patient has been off of Lasix due to apparent increase in her creatinine with no significant worsening of her lower extremity edema.        Vitals:    01/16/25 1023   BP: 124/70   BP Location: Left arm   Patient Position: Sitting   Pulse: 74   SpO2: 99%   Weight: 66.7 kg (147 lb)   Height: 154.9 cm (61\")       PE:  General: NAD  Neck: no JVD, no carotid bruits, no TM  Heart RRR, NL S1, S2, S4 present, no rubs, murmurs  Lungs: CTA, no wheezes, rhonchi, or rales  Abd: soft, non-tender, NL BS  Ext: No musculoskeletal deformities, no edema, cyanosis, or clubbing  Psych: normal mood and affect    Diagnostic Data:  Procedures.    BiV ICD: RA paced 64%, RV paced 91%. Battery 5.5-5.8 years on battery. 3.6% PVCs. No AFIB.     1. Ventricular premature beats    2. Cardiomyopathy, dilated    3. Chronic systolic heart failure    4. Coronary artery disease of bypass graft of native heart with stable angina pectoris            Plan:    1) Premature ventricular contractions  -s/p PVC RFA with Dr. Chin in 2015.   - 3.6% PVCS on device.  BiV pacing at 91%.  May ultimately require PVC suppression with either antiarrhythmic therapy or ablation.  - off Amiodarone since 10/2022        2) Dilated cardiomyopathy  -Follows with Dr. Wright and currently on Entresto,Toprol-XL, Lasix.  No Farxiga due to possible RAJESH.  LVEF improved to 50%     - Echocardiogram 5/25/2023: 50%, LA cavity is mild to moderately dilated, mild to " moderate MR, mild TR.   - normal BiV ICD function today.  See #1.  Patient with class I-II CHF symptoms.     3) Chronic Systolic CHF: Class 1 to class II CHF symptoms.  -Entresto, Toprol XL, Farxiga.        4) CAD:  -  [facility] 7/22/2021 which showed patent stent and no significant coronary disease  - follows with Dr. Wright      F/up in 6 months

## 2025-02-10 RX ORDER — SACUBITRIL AND VALSARTAN 24; 26 MG/1; MG/1
1 TABLET, FILM COATED ORAL 2 TIMES DAILY
Qty: 180 TABLET | Refills: 3 | OUTPATIENT
Start: 2025-02-10

## 2025-02-11 RX ORDER — SACUBITRIL AND VALSARTAN 24; 26 MG/1; MG/1
1 TABLET, FILM COATED ORAL 2 TIMES DAILY
Qty: 180 TABLET | Refills: 0 | Status: SHIPPED | OUTPATIENT
Start: 2025-02-11

## 2025-02-11 NOTE — TELEPHONE ENCOUNTER
Caller: Elmer perez    Relationship: Emergency Contact    Best call back number: [phone]      Requested Prescriptions:   Requested Prescriptions     Pending Prescriptions Disp Refills    sacubitril-valsartan (Entresto) 24-26 MG tablet 180 tablet 3     Sig: Take 1 tablet by mouth 2 (Two) Times a Day.        Pharmacy where request should be sent: [address] [phone] [facility] [phone]      Last office visit with prescribing clinician: 5/25/2023   Last telemedicine visit with prescribing clinician: Visit date not found   Next office visit with prescribing clinician: 9/15/2025     Additional details provided by patient: PT HAS ABOUT 30 DAY MEDS LEFT.     Does the patient have less than a 3 day supply:  [] Yes  [x] No    Would you like a call back once the refill request has been completed: [] Yes [x] No    If the office needs to give you a call back, can they leave a voicemail: [] Yes [x] No    Ursula Umaña Rep   02/11/25 13:09 EST

## 2025-04-01 RX ORDER — METOPROLOL SUCCINATE 25 MG/1
12.5 TABLET, EXTENDED RELEASE ORAL DAILY
Qty: 45 TABLET | Refills: 3 | Status: SHIPPED | OUTPATIENT
Start: 2025-04-01

## 2025-04-14 RX ORDER — SACUBITRIL AND VALSARTAN 24; 26 MG/1; MG/1
TABLET, FILM COATED ORAL
Qty: 180 TABLET | Refills: 3 | Status: SHIPPED | OUTPATIENT
Start: 2025-04-14

## 2025-05-27 ENCOUNTER — OFFICE VISIT (OUTPATIENT)
Dept: CARDIOLOGY | Facility: CLINIC | Age: 85
End: 2025-05-27
Payer: MEDICARE

## 2025-05-27 ENCOUNTER — LAB (OUTPATIENT)
Dept: LAB | Facility: HOSPITAL | Age: 85
End: 2025-05-27
Payer: MEDICARE

## 2025-05-27 VITALS
OXYGEN SATURATION: 97 % | HEIGHT: 62 IN | BODY MASS INDEX: 28.01 KG/M2 | WEIGHT: 152.2 LBS | DIASTOLIC BLOOD PRESSURE: 68 MMHG | SYSTOLIC BLOOD PRESSURE: 122 MMHG | HEART RATE: 84 BPM

## 2025-05-27 DIAGNOSIS — I10 ESSENTIAL HYPERTENSION: ICD-10-CM

## 2025-05-27 DIAGNOSIS — E78.00 HYPERCHOLESTEROLEMIA: ICD-10-CM

## 2025-05-27 DIAGNOSIS — I50.22 CHRONIC SYSTOLIC HEART FAILURE: ICD-10-CM

## 2025-05-27 DIAGNOSIS — I25.708 CORONARY ARTERY DISEASE OF BYPASS GRAFT OF NATIVE HEART WITH STABLE ANGINA PECTORIS: Primary | ICD-10-CM

## 2025-05-27 DIAGNOSIS — R07.2 PRECORDIAL CHEST PAIN: ICD-10-CM

## 2025-05-27 DIAGNOSIS — I25.708 CORONARY ARTERY DISEASE OF BYPASS GRAFT OF NATIVE HEART WITH STABLE ANGINA PECTORIS: ICD-10-CM

## 2025-05-27 LAB
ALBUMIN SERPL-MCNC: 4.3 G/DL (ref 3.5–5.2)
ALBUMIN/GLOB SERPL: 1.5 G/DL
ALP SERPL-CCNC: 109 U/L (ref 39–117)
ALT SERPL W P-5'-P-CCNC: 17 U/L (ref 1–33)
ANION GAP SERPL CALCULATED.3IONS-SCNC: 11.6 MMOL/L (ref 5–15)
ARTICHOKE IGE QN: 53 MG/DL (ref 0–100)
AST SERPL-CCNC: 22 U/L (ref 1–32)
BILIRUB SERPL-MCNC: 0.4 MG/DL (ref 0–1.2)
BUN SERPL-MCNC: 27 MG/DL (ref 8–23)
BUN/CREAT SERPL: 23.3 (ref 7–25)
CALCIUM SPEC-SCNC: 9.3 MG/DL (ref 8.6–10.5)
CHLORIDE SERPL-SCNC: 109 MMOL/L (ref 98–107)
CO2 SERPL-SCNC: 23.4 MMOL/L (ref 22–29)
CREAT SERPL-MCNC: 1.16 MG/DL (ref 0.57–1)
EGFRCR SERPLBLD CKD-EPI 2021: 47.2 ML/MIN/1.73
GLOBULIN UR ELPH-MCNC: 2.9 GM/DL
GLUCOSE SERPL-MCNC: 79 MG/DL (ref 65–99)
NT-PROBNP SERPL-MCNC: 1573 PG/ML (ref 0–1800)
POTASSIUM SERPL-SCNC: 4.6 MMOL/L (ref 3.5–5.2)
PROT SERPL-MCNC: 7.2 G/DL (ref 6–8.5)
SODIUM SERPL-SCNC: 144 MMOL/L (ref 136–145)
TSH SERPL DL<=0.05 MIU/L-ACNC: 1.77 UIU/ML (ref 0.27–4.2)

## 2025-05-27 PROCEDURE — G2211 COMPLEX E/M VISIT ADD ON: HCPCS | Performed by: INTERNAL MEDICINE

## 2025-05-27 PROCEDURE — 84443 ASSAY THYROID STIM HORMONE: CPT

## 2025-05-27 PROCEDURE — 99214 OFFICE O/P EST MOD 30 MIN: CPT | Performed by: INTERNAL MEDICINE

## 2025-05-27 PROCEDURE — 3074F SYST BP LT 130 MM HG: CPT | Performed by: INTERNAL MEDICINE

## 2025-05-27 PROCEDURE — 83721 ASSAY OF BLOOD LIPOPROTEIN: CPT

## 2025-05-27 PROCEDURE — 85027 COMPLETE CBC AUTOMATED: CPT

## 2025-05-27 PROCEDURE — 36415 COLL VENOUS BLD VENIPUNCTURE: CPT

## 2025-05-27 PROCEDURE — 80053 COMPREHEN METABOLIC PANEL: CPT

## 2025-05-27 PROCEDURE — 3078F DIAST BP <80 MM HG: CPT | Performed by: INTERNAL MEDICINE

## 2025-05-27 PROCEDURE — 83880 ASSAY OF NATRIURETIC PEPTIDE: CPT

## 2025-05-27 RX ORDER — LEVOTHYROXINE SODIUM 75 UG/1
75 TABLET ORAL
COMMUNITY

## 2025-05-27 NOTE — PROGRESS NOTES
[facility] Cardiology   [address], [address]  [place], [place], [address]    [phone]  WWW.[facility]           OUTPATIENT CLINIC FOLLOW UP NOTE    Patient Care Team:  Patient Care Team:  Alda Ayers MD as PCP - General (Internal Medicine)  Yobany Wright MD as Consulting Physician (Cardiology)  Haylie Olivares APRN as Nurse Practitioner (Cardiology)    Subjective:      Chief Complaint   Patient presents with    Coronary artery disease of bypass graft of native heart wit     F/U       HPI:    Estefani Jordan is a 84 y.o. female.  Problem list:  CAD  Status post stenting to the LAD in 2015; EF at that time was 25%.  Nuclear stress test 9/2020 OSH with normal EF and no evidence of ischemia.  C 7/22/2021: No hemodynamically significant, flow-limiting stenoses  Chronic systolic heart failure, ischemic cardiomyopathy  EF 25% in 2015.  EF noted to be normal at time of nuclear stress testing 9/2020.  EF 7/9/2021 per TTE 21 to 25%.  Status post BiV ICD 12/07/2021, Dr. Sánchez  TTE 5/2023: LVEF 50%, mildly dilated LV, mild LVH, wall motion abnormalities noted in report, grade 2 DD, mildly to moderately dilated LA, mildly calcified AV, mild to moderate MR, mild TR  Frequent PVCs  Status post PVC ablation in 2015.  14-day cardiac monitor 6/2021: 22% PVC burden, NSVT noted with longest being 5 beats.  Hypertension  Hyperlipidemia    She presents today for follow-up.     Since her last visit, she has noticed some changes from a cardiopulmonary standpoint.  Over the last 6 months she has had increased exertional dyspnea, most notable with inclines/steps.  Some associated chest heaviness.  Has some different isolated left-sided chest pains that radiate toward the back.  Might be similar to some of the pains that she felt prior to warranting a stent back in 2015.    Was noted to have some renal insufficiency.  Lasix was switched from daily to as needed.  Now she takes it  "quite sparingly.  Mild associated swelling.    Review of Systems:  As noted in above HPI.      PFSH:  Patient Active Problem List   Diagnosis    Coronary arteriosclerosis in native artery    Chronic systolic heart failure    Essential hypertension    Gastroesophageal reflux disease    History of depression    Hypercholesterolemia    Left bundle branch block    Obstructive sleep apnea syndrome    Ventricular premature beats    Acute systolic CHF (congestive heart failure)    Coronary artery disease of bypass graft of native heart with stable angina pectoris    Frequent PVCs    Cardiomyopathy, dilated         Current Outpatient Medications:     aspirin 81 MG EC tablet, Take 1 tablet by mouth Daily., Disp: , Rfl:     atorvastatin (LIPITOR) 20 MG tablet, Take 1 tablet by mouth Daily., Disp: , Rfl:     cetirizine (zyrTEC) 10 MG tablet, Take 1 tablet by mouth Daily., Disp: , Rfl:     Entresto 24-26 MG tablet, TAKE 1 TABLET BY MOUTH TWICE  DAILY . NEED LAB WORK FOR  FURTHER REFILLS, Disp: 180 tablet, Rfl: 3    levothyroxine (SYNTHROID, LEVOTHROID) 75 MCG tablet, Take 1 tablet by mouth Every Morning. Per PCP, Disp: , Rfl:     metoprolol succinate XL (TOPROL-XL) 25 MG 24 hr tablet, Take 0.5 tablets by mouth Daily., Disp: 45 tablet, Rfl: 3    metroNIDAZOLE (METROCREAM) 0.75 % cream, , Disp: , Rfl:     montelukast (SINGULAIR) 10 MG tablet, Take 1 tablet by mouth every night at bedtime., Disp: , Rfl:     sertraline (ZOLOFT) 50 MG tablet, Take 1 tablet by mouth Daily., Disp: , Rfl:     empagliflozin (Jardiance) 10 MG tablet tablet, Take 1 tablet by mouth Daily., Disp: 30 tablet, Rfl: 5    Allergies   Allergen Reactions    Contrast Dye (Echo Or Unknown Ct/Mr) Rash        reports that she has never smoked. She has been exposed to tobacco smoke. She has never used smokeless tobacco.      Objective:   Physical exam:  /68 (BP Location: Right arm, Patient Position: Sitting, Cuff Size: Adult)   Pulse 84   Ht 156.2 cm (61.5\")   " "Wt 69 kg (152 lb 3.2 oz)   SpO2 97%   BMI 28.29 kg/m²   CONSTITUTIONAL: No acute distress  RESPIRATORY: Normal effort. Clear to auscultation bilaterally without wheezing or rales  CARDIOVASCULAR: Carotids with normal upstrokes without bruits.  Regular rate and rhythm with normal S1 and S2. Without murmur. Normal right radial pulse. There is no lower extremity edema bilaterally.    Labs:      Lab Results   Component Value Date    CHOL 144 07/22/2021     Lab Results   Component Value Date    TRIG 49 07/22/2021     Lab Results   Component Value Date    HDL 61 (H) 07/22/2021     Lab Results   Component Value Date    LDL 72 07/22/2021     No components found for: \"LDLDIRECTC\"    8/2024 labs: LDL 57    Diagnostic Data:    Procedures    Results for orders placed during the hospital encounter of 05/25/23    Adult Transthoracic Echo Complete W/ Cont if Necessary Per Protocol    Interpretation Summary    Left ventricular systolic function is low normal. Estimated left ventricular EF = 50%    The left ventricular cavity is mildly dilated.    Left ventricular wall thickness is consistent with mild concentric hypertrophy.    The following left ventricular wall segments are hypokinetic: basal anterolateral, mid anterolateral, basal inferolateral, mid inferolateral and basal anterior.    Left ventricular diastolic function is consistent with (grade II w/high LAP) pseudonormalization.    The left atrial cavity is mild to moderately dilated.    There is mild calcification of the aortic valve.    Mild to moderate mitral valve regurgitation is present.    Mild tricuspid valve regurgitation is present.        Assessment and Plan:     Progressive dyspnea  Chronic systolic heart failure, possibly mixed ischemic and nonischemic  Mitral regurgitation  - EF improved from 20-25% to 30-35% in 10/2021, Status post Biv ICD with Dr. Sánchez, 12/2021. Echo 5/2023 EF to 50%.     - Worsening exertional dyspnea.  - CT chest non conrtast 4/2025: " No significant findings/interval change  - Repeat echo.  Stress test as noted below.  Repeat labs ordered.    - Continue Entresto, beta-blocker  - Possibly had renal insufficiency related to Farxiga.  Will try Jardiance.  Close renal function monitoring for drug toxicity thereafter with repeat labs in 5 to 7 days  - Holding off daily Lasix due to renal insufficiency.  Can be continued on a as needed basis for now.    Coronary artery disease  - Patent LAD stent on 7/2021 heart cath  - Ongoing worsening exertional symptoms, including chest discomfort, possible angina  - Recommend Lexiscan nuclear stress test.  - Continue current related medications   - Previously on Imdur; was discontinued; data deficit.  Will consider readding it based on stress test results    Frequent PVCs   Left bundle branch block  Relative bradycardia  -Prior PVC ablation in 2015.  -Continue EP follow-up, metoprolol    Essential hypertension  -Continue current related medications.      - Return in about 6 months (around 11/27/2025) for Next follow up with ROSALBA Gamez, Next follow up with an ECG, if none in last year.

## 2025-05-28 LAB
DEPRECATED RDW RBC AUTO: 44.4 FL (ref 37–54)
ERYTHROCYTE [DISTWIDTH] IN BLOOD BY AUTOMATED COUNT: 12.1 % (ref 12.3–15.4)
HCT VFR BLD AUTO: 41.5 % (ref 34–46.6)
HGB BLD-MCNC: 13.2 G/DL (ref 12–15.9)
MCH RBC QN AUTO: 31.7 PG (ref 26.6–33)
MCHC RBC AUTO-ENTMCNC: 31.8 G/DL (ref 31.5–35.7)
MCV RBC AUTO: 99.5 FL (ref 79–97)
PLATELET # BLD AUTO: 147 10*3/MM3 (ref 140–450)
PMV BLD AUTO: 10.7 FL (ref 6–12)
RBC # BLD AUTO: 4.17 10*6/MM3 (ref 3.77–5.28)
WBC NRBC COR # BLD AUTO: 6.31 10*3/MM3 (ref 3.4–10.8)

## 2025-05-29 ENCOUNTER — RESULTS FOLLOW-UP (OUTPATIENT)
Dept: CARDIOLOGY | Facility: CLINIC | Age: 85
End: 2025-05-29
Payer: MEDICARE

## 2025-05-29 NOTE — TELEPHONE ENCOUNTER
Pt notified of lab results. Pt starting Jardiance 10 mg daily today. Pt agreeable to await f/u after stress test and echo. Scheduling messaged to reach out to patient's . Pt has no further questions at this time.

## 2025-05-29 NOTE — TELEPHONE ENCOUNTER
----- Message from Yobany Wright sent at 5/29/2025  7:17 AM EDT -----  Labs reviewed. Does show some fluid retention, suspected to be heart related like in the past. Will continue see how the addition of Jardiance goes. Awaiting stress and echo. At that time, if okay,   then will continue current plan. If still dyspneic, will increase Jardiance to 25 mg daily  ----- Message -----  From: Lab, Background User  Sent: 5/27/2025   7:35 PM EDT  To: Yobany Wright MD

## 2025-07-28 ENCOUNTER — HOSPITAL ENCOUNTER (OUTPATIENT)
Dept: CARDIOLOGY | Facility: HOSPITAL | Age: 85
Discharge: HOME | End: 2025-07-28
Payer: MEDICARE

## 2025-07-28 VITALS — BODY MASS INDEX: 28.7 KG/M2 | WEIGHT: 152 LBS | HEIGHT: 61 IN

## 2025-07-28 DIAGNOSIS — R07.2 PRECORDIAL CHEST PAIN: ICD-10-CM

## 2025-07-28 DIAGNOSIS — I50.22 CHRONIC SYSTOLIC HEART FAILURE: ICD-10-CM

## 2025-07-28 LAB
AORTIC DIMENSIONLESS INDEX: 0.55 (DI)
ASCENDING AORTA: 3.2 CM
AV MEAN PRESS GRAD SYS DOP V1V2: 3.2 MMHG
AV VMAX SYS DOP: 121.9 CM/SEC
BH CV ECHO MEAS - AO MAX PG: 5.9 MMHG
BH CV ECHO MEAS - AO ROOT DIAM: 3.2 CM
BH CV ECHO MEAS - AO V2 VTI: 26.3 CM
BH CV ECHO MEAS - AVA(I,D): 2.27 CM2
BH CV ECHO MEAS - EDV(MOD-SP2): 114.4 ML
BH CV ECHO MEAS - EF(MOD-SP2): 40.6 %
BH CV ECHO MEAS - ESV(MOD-SP2): 68 ML
BH CV ECHO MEAS - IVS/LVPW: 1 CM
BH CV ECHO MEAS - IVSD: 1 CM
BH CV ECHO MEAS - LA DIMENSION: 3.6 CM
BH CV ECHO MEAS - LAT PEAK E' VEL: 7.2 CM/SEC
BH CV ECHO MEAS - LV MAX PG: 1.47 MMHG
BH CV ECHO MEAS - LV MEAN PG: 0.6 MMHG
BH CV ECHO MEAS - LV V1 MAX: 60.6 CM/SEC
BH CV ECHO MEAS - LV V1 VTI: 14.4 CM
BH CV ECHO MEAS - LVIDD: 5.4 CM
BH CV ECHO MEAS - LVIDS: 4.3 CM
BH CV ECHO MEAS - LVOT AREA: 4.2 CM2
BH CV ECHO MEAS - LVOT DIAM: 2.3 CM
BH CV ECHO MEAS - LVPWD: 1 CM
BH CV ECHO MEAS - MED PEAK E' VEL: 3.6 CM/SEC
BH CV ECHO MEAS - MR MAX PG: 130.8 MMHG
BH CV ECHO MEAS - MR MAX VEL: 571.8 CM/SEC
BH CV ECHO MEAS - MR MEAN PG: 80.2 MMHG
BH CV ECHO MEAS - MR VTI: 229.7 CM
BH CV ECHO MEAS - MV A MAX VEL: 98 CM/SEC
BH CV ECHO MEAS - MV E MAX VEL: 33 CM/SEC
BH CV ECHO MEAS - MV E/A: 0.3
BH CV ECHO MEAS - MV MAX PG: 4.1 MMHG
BH CV ECHO MEAS - MV MEAN PG: 1.4 MMHG
BH CV ECHO MEAS - MV V2 VTI: 25.1 CM
BH CV ECHO MEAS - MVA(VTI): 2.38 CM2
BH CV ECHO MEAS - PA ACC TIME: 0.08 SEC
BH CV ECHO MEAS - PA V2 MAX: 75 CM/SEC
BH CV ECHO MEAS - RAP SYSTOLE: 3 MMHG
BH CV ECHO MEAS - RV MAX PG: 1 MMHG
BH CV ECHO MEAS - RV V1 MAX: 50 CM/SEC
BH CV ECHO MEAS - RV V1 VTI: 10.7 CM
BH CV ECHO MEAS - SV(LVOT): 59.8 ML
BH CV ECHO MEAS - SV(MOD-SP2): 46.4 ML
BH CV ECHO MEAS - SVI(LVOT): 35.6 ML/M2
BH CV ECHO MEAS - SVI(MOD-SP2): 27.6 ML/M2
BH CV ECHO MEAS - TAPSE (>1.6): 1.74 CM
BH CV ECHO MEASUREMENTS AVERAGE E/E' RATIO: 6.11
BH CV REST NUCLEAR ISOTOPE DOSE: 9.5 MCI
BH CV STRESS BP STAGE 2: NORMAL
BH CV STRESS BP STAGE 4: NORMAL
BH CV STRESS COMMENTS STAGE 1: NORMAL
BH CV STRESS DOSE REGADENOSON STAGE 1: 0.4
BH CV STRESS DURATION MIN STAGE 1: 1
BH CV STRESS DURATION MIN STAGE 2: 1
BH CV STRESS DURATION MIN STAGE 3: 1
BH CV STRESS DURATION MIN STAGE 4: 1
BH CV STRESS DURATION SEC STAGE 1: 10
BH CV STRESS DURATION SEC STAGE 2: 0
BH CV STRESS HR STAGE 1: 67
BH CV STRESS HR STAGE 2: 98
BH CV STRESS HR STAGE 3: 97
BH CV STRESS HR STAGE 4: 67
BH CV STRESS NUCLEAR ISOTOPE DOSE: 33 MCI
BH CV STRESS O2 STAGE 1: 98
BH CV STRESS O2 STAGE 2: 98
BH CV STRESS O2 STAGE 3: 100
BH CV STRESS O2 STAGE 4: 100
BH CV STRESS PROTOCOL 1: NORMAL
BH CV STRESS RECOVERY BP: NORMAL MMHG
BH CV STRESS RECOVERY HR: 96 BPM
BH CV STRESS RECOVERY O2: 99 %
BH CV STRESS STAGE 1: 1
BH CV STRESS STAGE 2: 2
BH CV STRESS STAGE 3: 3
BH CV STRESS STAGE 4: 4
BH CV XLRA - RV BASE: 3.3 CM
BH CV XLRA - RV LENGTH: 7.9 CM
BH CV XLRA - RV MID: 2.47 CM
BH CV XLRA - TDI S': 9 CM/SEC
IVRT: 100 MS
LEFT ATRIUM VOLUME INDEX: 43.5 ML/M2
MAXIMAL PREDICTED HEART RATE: 138 BPM
PERCENT MAX PREDICTED HR: 73.19 %
SPECT HRT GATED+EF W RNC IV: 55 %
STRESS BASELINE BP: NORMAL MMHG
STRESS BASELINE HR: 72 BPM
STRESS O2 SAT REST: 98 %
STRESS PERCENT HR: 86 %
STRESS POST ESTIMATED WORKLOAD: 1 METS
STRESS POST EXERCISE DUR MIN: 4 MIN
STRESS POST EXERCISE DUR SEC: 0 SEC
STRESS POST O2 SAT PEAK: 98 %
STRESS POST PEAK BP: NORMAL MMHG
STRESS POST PEAK HR: 101 BPM
STRESS TARGET HR: 117 BPM

## 2025-07-28 PROCEDURE — 93306 TTE W/DOPPLER COMPLETE: CPT

## 2025-07-28 PROCEDURE — 93017 CV STRESS TEST TRACING ONLY: CPT

## 2025-07-28 PROCEDURE — A9500 TC99M SESTAMIBI: HCPCS | Performed by: INTERNAL MEDICINE

## 2025-07-28 PROCEDURE — 25010000002 REGADENOSON 0.4 MG/5ML SOLUTION: Performed by: INTERNAL MEDICINE

## 2025-07-28 PROCEDURE — 93306 TTE W/DOPPLER COMPLETE: CPT | Performed by: INTERNAL MEDICINE

## 2025-07-28 PROCEDURE — 78452 HT MUSCLE IMAGE SPECT MULT: CPT | Performed by: INTERNAL MEDICINE

## 2025-07-28 PROCEDURE — 93016 CV STRESS TEST SUPVJ ONLY: CPT | Performed by: INTERNAL MEDICINE

## 2025-07-28 PROCEDURE — 78452 HT MUSCLE IMAGE SPECT MULT: CPT

## 2025-07-28 PROCEDURE — 93018 CV STRESS TEST I&R ONLY: CPT | Performed by: INTERNAL MEDICINE

## 2025-07-28 PROCEDURE — 34310000005 TECHNETIUM SESTAMIBI: Performed by: INTERNAL MEDICINE

## 2025-07-28 RX ORDER — REGADENOSON 0.08 MG/ML
0.4 INJECTION, SOLUTION INTRAVENOUS ONCE
Status: COMPLETED | OUTPATIENT
Start: 2025-07-28 | End: 2025-07-28

## 2025-07-28 RX ADMIN — TECHNETIUM TC 99M SESTAMIBI 1 DOSE: 1 INJECTION INTRAVENOUS at 12:50

## 2025-07-28 RX ADMIN — TECHNETIUM TC 99M SESTAMIBI 1 DOSE: 1 INJECTION INTRAVENOUS at 12:56

## 2025-07-28 RX ADMIN — REGADENOSON 0.4 MG: 0.08 INJECTION, SOLUTION INTRAVENOUS at 12:46

## 2025-07-29 PROBLEM — R94.39 ABNORMAL STRESS TEST: Status: ACTIVE | Noted: 2025-05-29

## 2025-07-31 LAB
MC_CV_MDC_IDC_RATE_1: 150
MC_CV_MDC_IDC_RATE_1: 190
MC_CV_MDC_IDC_RATE_1: 222
MC_CV_MDC_IDC_SHOCK_MEASURED_IMPEDANCE: 79
MC_CV_MDC_IDC_THERAPIES: NORMAL
MC_CV_MDC_IDC_THERAPIES: NORMAL
MC_CV_MDC_IDC_ZONE_ID: 1
MC_CV_MDC_IDC_ZONE_ID: 2
MC_CV_MDC_IDC_ZONE_ID: 3
MDC_IDC_MSMT_BATTERY_REMAINING_LONGEVITY: 47 MO
MDC_IDC_MSMT_BATTERY_REMAINING_PERCENTAGE: 55 %
MDC_IDC_MSMT_BATTERY_RRT_TRIGGER: 2.62
MDC_IDC_MSMT_BATTERY_STATUS: NORMAL
MDC_IDC_MSMT_BATTERY_VOLTAGE: 2.95
MDC_IDC_MSMT_CAP_CHARGE_TIME: 9.1
MDC_IDC_MSMT_LEADCHNL_LV_IMPEDANCE_VALUE: 950
MDC_IDC_MSMT_LEADCHNL_LV_PACING_THRESHOLD_POLARITY: NORMAL
MDC_IDC_MSMT_LEADCHNL_RA_IMPEDANCE_VALUE: 330
MDC_IDC_MSMT_LEADCHNL_RA_PACING_THRESHOLD_POLARITY: NORMAL
MDC_IDC_MSMT_LEADCHNL_RA_SENSING_INTR_AMPL: 2.4
MDC_IDC_MSMT_LEADCHNL_RV_IMPEDANCE_VALUE: 440
MDC_IDC_MSMT_LEADCHNL_RV_PACING_THRESHOLD_POLARITY: NORMAL
MDC_IDC_MSMT_LEADCHNL_RV_SENSING_INTR_AMPL: 12
MDC_IDC_PG_IMPLANT_DTM: NORMAL
MDC_IDC_PG_MFG: NORMAL
MDC_IDC_PG_MODEL: NORMAL
MDC_IDC_PG_SERIAL: NORMAL
MDC_IDC_PG_TYPE: NORMAL
MDC_IDC_SESS_DTM: NORMAL
MDC_IDC_SESS_TYPE: NORMAL
MDC_IDC_SET_BRADY_AT_MODE_SWITCH_RATE: 180
MDC_IDC_SET_BRADY_LOWRATE: 60
MDC_IDC_SET_BRADY_MAX_SENSOR_RATE: 130
MDC_IDC_SET_BRADY_MAX_TRACKING_RATE: 130
MDC_IDC_SET_BRADY_MODE: NORMAL
MDC_IDC_SET_BRADY_PAV_DELAY: 140
MDC_IDC_SET_BRADY_SAV_DELAY: 90
MDC_IDC_SET_CRT_LVRV_DELAY: 80
MDC_IDC_SET_CRT_PACED_CHAMBERS: NORMAL
MDC_IDC_SET_LEADCHNL_LV_PACING_AMPLITUDE: 2
MDC_IDC_SET_LEADCHNL_LV_PACING_POLARITY: NORMAL
MDC_IDC_SET_LEADCHNL_LV_PACING_PULSEWIDTH: 0.5
MDC_IDC_SET_LEADCHNL_RA_PACING_AMPLITUDE: 2
MDC_IDC_SET_LEADCHNL_RA_PACING_POLARITY: NORMAL
MDC_IDC_SET_LEADCHNL_RA_PACING_PULSEWIDTH: 0.5
MDC_IDC_SET_LEADCHNL_RA_SENSING_POLARITY: NORMAL
MDC_IDC_SET_LEADCHNL_RA_SENSING_SENSITIVITY: 0.3
MDC_IDC_SET_LEADCHNL_RV_PACING_AMPLITUDE: 2
MDC_IDC_SET_LEADCHNL_RV_PACING_POLARITY: NORMAL
MDC_IDC_SET_LEADCHNL_RV_PACING_PULSEWIDTH: 0.5
MDC_IDC_SET_LEADCHNL_RV_SENSING_POLARITY: NORMAL
MDC_IDC_SET_LEADCHNL_RV_SENSING_SENSITIVITY: 0.3
MDC_IDC_SET_ZONE_STATUS: NORMAL
MDC_IDC_SET_ZONE_TYPE: NORMAL
MDC_IDC_STAT_AT_BURDEN_PERCENT: 0
MDC_IDC_STAT_BRADY_RA_PERCENT_PACED: 66
MDC_IDC_STAT_BRADY_RV_PERCENT_PACED: 91
MDC_IDC_STAT_CRT_PERCENT_PACED: 91
MDC_IDC_STAT_TACHYTHERAPY_ATP_DELIVERED_RECENT: 0
MDC_IDC_STAT_TACHYTHERAPY_SHOCKS_ABORTED_RECENT: 0
MDC_IDC_STAT_TACHYTHERAPY_SHOCKS_DELIVERED_RECENT: 0

## 2025-08-07 ENCOUNTER — PREP FOR SURGERY (OUTPATIENT)
Dept: OTHER | Facility: HOSPITAL | Age: 85
End: 2025-08-07
Payer: MEDICARE

## 2025-08-07 DIAGNOSIS — R94.39 ABNORMAL STRESS TEST: ICD-10-CM

## 2025-08-07 DIAGNOSIS — I25.10 CORONARY ARTERIOSCLEROSIS IN NATIVE ARTERY: Primary | ICD-10-CM

## 2025-08-07 RX ORDER — ASPIRIN 81 MG/1
81 TABLET ORAL DAILY
OUTPATIENT
Start: 2025-08-08

## 2025-08-07 RX ORDER — SODIUM CHLORIDE 0.9 % (FLUSH) 0.9 %
10 SYRINGE (ML) INJECTION AS NEEDED
OUTPATIENT
Start: 2025-08-07

## 2025-08-07 RX ORDER — ASPIRIN 81 MG/1
324 TABLET, CHEWABLE ORAL ONCE
OUTPATIENT
Start: 2025-08-07 | End: 2025-08-07

## 2025-08-07 RX ORDER — SODIUM CHLORIDE 9 MG/ML
40 INJECTION, SOLUTION INTRAVENOUS AS NEEDED
OUTPATIENT
Start: 2025-08-07

## 2025-08-07 RX ORDER — SODIUM CHLORIDE 0.9 % (FLUSH) 0.9 %
10 SYRINGE (ML) INJECTION EVERY 12 HOURS SCHEDULED
OUTPATIENT
Start: 2025-08-07

## 2025-08-11 ENCOUNTER — TELEPHONE (OUTPATIENT)
Dept: CARDIOLOGY | Facility: CLINIC | Age: 85
End: 2025-08-11
Payer: MEDICARE

## 2025-08-14 LAB
BASOPHILS # BLD AUTO: 0 X10E3/UL (ref 0–0.2)
BASOPHILS NFR BLD AUTO: 0 %
BNP SERPL-MCNC: 290.5 PG/ML (ref 0–100)
BUN SERPL-MCNC: 33 MG/DL (ref 8–27)
BUN/CREAT SERPL: 28 (ref 12–28)
CHLORIDE SERPL-SCNC: 107 MMOL/L (ref 96–106)
CO2 SERPL-SCNC: 22 MMOL/L (ref 20–29)
CREAT SERPL-MCNC: 1.18 MG/DL (ref 0.57–1)
EGFRCR SERPLBLD CKD-EPI 2021: 46 ML/MIN/1.73
EOSINOPHIL # BLD AUTO: 0.3 X10E3/UL (ref 0–0.4)
EOSINOPHIL NFR BLD AUTO: 5 %
ERYTHROCYTE [DISTWIDTH] IN BLOOD BY AUTOMATED COUNT: 12.5 % (ref 11.7–15.4)
GLUCOSE SERPL-MCNC: 81 MG/DL (ref 70–99)
HCT VFR BLD AUTO: 41.8 % (ref 34–46.6)
HGB BLD-MCNC: 13.1 G/DL (ref 11.1–15.9)
IMM GRANULOCYTES # BLD AUTO: 0 X10E3/UL (ref 0–0.1)
IMM GRANULOCYTES NFR BLD AUTO: 0 %
LYMPHOCYTES # BLD AUTO: 2.2 X10E3/UL (ref 0.7–3.1)
LYMPHOCYTES NFR BLD AUTO: 41 %
MCH RBC QN AUTO: 31.8 PG (ref 26.6–33)
MCHC RBC AUTO-ENTMCNC: 31.3 G/DL (ref 31.5–35.7)
MCV RBC AUTO: 102 FL (ref 79–97)
MONOCYTES # BLD AUTO: 0.5 X10E3/UL (ref 0.1–0.9)
MONOCYTES NFR BLD AUTO: 9 %
NEUTROPHILS # BLD AUTO: 2.3 X10E3/UL (ref 1.4–7)
NEUTROPHILS NFR BLD AUTO: 45 %
PLATELET # BLD AUTO: 140 X10E3/UL (ref 150–450)
POTASSIUM SERPL-SCNC: 4.9 MMOL/L (ref 3.5–5.2)
RBC # BLD AUTO: 4.12 X10E6/UL (ref 3.77–5.28)
SODIUM SERPL-SCNC: 143 MMOL/L (ref 134–144)
WBC # BLD AUTO: 5.3 X10E3/UL (ref 3.4–10.8)

## 2025-08-19 ENCOUNTER — HOSPITAL ENCOUNTER (OUTPATIENT)
Facility: HOSPITAL | Age: 85
Setting detail: HOSPITAL OUTPATIENT SURGERY
Discharge: HOME | End: 2025-08-19
Attending: INTERNAL MEDICINE | Admitting: INTERNAL MEDICINE
Payer: MEDICARE

## 2025-08-19 VITALS
OXYGEN SATURATION: 93 % | WEIGHT: 146.2 LBS | SYSTOLIC BLOOD PRESSURE: 112 MMHG | BODY MASS INDEX: 27.6 KG/M2 | RESPIRATION RATE: 14 BRPM | HEART RATE: 70 BPM | DIASTOLIC BLOOD PRESSURE: 55 MMHG | HEIGHT: 61 IN | TEMPERATURE: 97 F

## 2025-08-19 DIAGNOSIS — R94.39 ABNORMAL STRESS TEST: ICD-10-CM

## 2025-08-19 DIAGNOSIS — I25.708 CORONARY ARTERY DISEASE OF BYPASS GRAFT OF NATIVE HEART WITH STABLE ANGINA PECTORIS: ICD-10-CM

## 2025-08-19 DIAGNOSIS — I42.0 CARDIOMYOPATHY, DILATED: ICD-10-CM

## 2025-08-19 DIAGNOSIS — I25.10 CORONARY ARTERIOSCLEROSIS IN NATIVE ARTERY: ICD-10-CM

## 2025-08-19 DIAGNOSIS — I50.22 CHRONIC SYSTOLIC HEART FAILURE: ICD-10-CM

## 2025-08-19 LAB
CHOLEST SERPL-MCNC: 150 MG/DL (ref 0–200)
HBA1C MFR BLD: 5.62 % (ref 4.8–5.6)
HDLC SERPL-MCNC: 71 MG/DL (ref 40–60)
LDLC SERPL CALC-MCNC: 70 MG/DL (ref 0–100)
LDLC/HDLC SERPL: 1 {RATIO}
NT-PROBNP SERPL-MCNC: 2389 PG/ML (ref 0–1800)
TRIGL SERPL-MCNC: 39 MG/DL (ref 0–150)
VLDLC SERPL-MCNC: 9 MG/DL (ref 5–40)

## 2025-08-19 PROCEDURE — 83036 HEMOGLOBIN GLYCOSYLATED A1C: CPT | Performed by: HOSPITALIST

## 2025-08-19 PROCEDURE — 25010000002 HEPARIN (PORCINE) PER 1000 UNITS: Performed by: INTERNAL MEDICINE

## 2025-08-19 PROCEDURE — 83880 ASSAY OF NATRIURETIC PEPTIDE: CPT | Performed by: NURSE PRACTITIONER

## 2025-08-19 PROCEDURE — 93458 L HRT ARTERY/VENTRICLE ANGIO: CPT | Performed by: INTERNAL MEDICINE

## 2025-08-19 PROCEDURE — 80061 LIPID PANEL: CPT | Performed by: HOSPITALIST

## 2025-08-19 PROCEDURE — 25010000002 MIDAZOLAM PER 1 MG: Performed by: INTERNAL MEDICINE

## 2025-08-19 PROCEDURE — 25510000001 IOPAMIDOL PER 1 ML: Performed by: INTERNAL MEDICINE

## 2025-08-19 PROCEDURE — C1769 GUIDE WIRE: HCPCS | Performed by: INTERNAL MEDICINE

## 2025-08-19 PROCEDURE — 25010000002 FENTANYL CITRATE (PF) 50 MCG/ML SOLUTION: Performed by: INTERNAL MEDICINE

## 2025-08-19 PROCEDURE — 25810000003 SODIUM CHLORIDE 0.9 % SOLUTION: Performed by: HOSPITALIST

## 2025-08-19 PROCEDURE — 36415 COLL VENOUS BLD VENIPUNCTURE: CPT

## 2025-08-19 PROCEDURE — C1894 INTRO/SHEATH, NON-LASER: HCPCS | Performed by: INTERNAL MEDICINE

## 2025-08-19 PROCEDURE — 25010000002 LIDOCAINE PF 1% 1 % SOLUTION: Performed by: INTERNAL MEDICINE

## 2025-08-19 PROCEDURE — 63710000001 DIPHENHYDRAMINE PER 50 MG: Performed by: INTERNAL MEDICINE

## 2025-08-19 PROCEDURE — 25010000002 NICARDIPINE 2.5 MG/ML SOLUTION: Performed by: INTERNAL MEDICINE

## 2025-08-19 RX ORDER — MIDAZOLAM HYDROCHLORIDE 1 MG/ML
INJECTION, SOLUTION INTRAMUSCULAR; INTRAVENOUS
Status: DISCONTINUED | OUTPATIENT
Start: 2025-08-19 | End: 2025-08-19 | Stop reason: HOSPADM

## 2025-08-19 RX ORDER — IOPAMIDOL 755 MG/ML
INJECTION, SOLUTION INTRAVASCULAR
Status: DISCONTINUED | OUTPATIENT
Start: 2025-08-19 | End: 2025-08-19 | Stop reason: HOSPADM

## 2025-08-19 RX ORDER — DIPHENHYDRAMINE HYDROCHLORIDE 50 MG/ML
50 INJECTION, SOLUTION INTRAMUSCULAR; INTRAVENOUS
Status: COMPLETED | OUTPATIENT
Start: 2025-08-19 | End: 2025-08-19

## 2025-08-19 RX ORDER — SODIUM CHLORIDE 0.9 % (FLUSH) 0.9 %
10 SYRINGE (ML) INJECTION AS NEEDED
Status: DISCONTINUED | OUTPATIENT
Start: 2025-08-19 | End: 2025-08-19 | Stop reason: HOSPADM

## 2025-08-19 RX ORDER — HEPARIN SODIUM 1000 [USP'U]/ML
INJECTION, SOLUTION INTRAVENOUS; SUBCUTANEOUS
Status: DISCONTINUED | OUTPATIENT
Start: 2025-08-19 | End: 2025-08-19 | Stop reason: HOSPADM

## 2025-08-19 RX ORDER — DIPHENHYDRAMINE HCL 50 MG
50 CAPSULE ORAL
Status: COMPLETED | OUTPATIENT
Start: 2025-08-19 | End: 2025-08-19

## 2025-08-19 RX ORDER — NITROGLYCERIN 0.4 MG/1
0.4 TABLET SUBLINGUAL
Status: DISCONTINUED | OUTPATIENT
Start: 2025-08-19 | End: 2025-08-19 | Stop reason: HOSPADM

## 2025-08-19 RX ORDER — LIDOCAINE HYDROCHLORIDE 10 MG/ML
INJECTION, SOLUTION EPIDURAL; INFILTRATION; INTRACAUDAL; PERINEURAL
Status: DISCONTINUED | OUTPATIENT
Start: 2025-08-19 | End: 2025-08-19 | Stop reason: HOSPADM

## 2025-08-19 RX ORDER — ACETAMINOPHEN 325 MG/1
650 TABLET ORAL EVERY 4 HOURS PRN
Status: DISCONTINUED | OUTPATIENT
Start: 2025-08-19 | End: 2025-08-19 | Stop reason: HOSPADM

## 2025-08-19 RX ORDER — SODIUM CHLORIDE 9 MG/ML
40 INJECTION, SOLUTION INTRAVENOUS AS NEEDED
Status: DISCONTINUED | OUTPATIENT
Start: 2025-08-19 | End: 2025-08-19 | Stop reason: HOSPADM

## 2025-08-19 RX ORDER — FENTANYL CITRATE 50 UG/ML
INJECTION, SOLUTION INTRAMUSCULAR; INTRAVENOUS
Status: DISCONTINUED | OUTPATIENT
Start: 2025-08-19 | End: 2025-08-19 | Stop reason: HOSPADM

## 2025-08-19 RX ORDER — ASPIRIN 81 MG/1
81 TABLET ORAL DAILY
Status: DISCONTINUED | OUTPATIENT
Start: 2025-08-20 | End: 2025-08-19 | Stop reason: HOSPADM

## 2025-08-19 RX ORDER — SODIUM CHLORIDE 0.9 % (FLUSH) 0.9 %
10 SYRINGE (ML) INJECTION EVERY 12 HOURS SCHEDULED
Status: DISCONTINUED | OUTPATIENT
Start: 2025-08-19 | End: 2025-08-19 | Stop reason: HOSPADM

## 2025-08-19 RX ORDER — ASPIRIN 81 MG/1
324 TABLET, CHEWABLE ORAL ONCE
Status: COMPLETED | OUTPATIENT
Start: 2025-08-19 | End: 2025-08-19

## 2025-08-19 RX ORDER — NICARDIPINE HYDROCHLORIDE 2.5 MG/ML
INJECTION INTRAVENOUS
Status: DISCONTINUED | OUTPATIENT
Start: 2025-08-19 | End: 2025-08-19 | Stop reason: HOSPADM

## 2025-08-19 RX ADMIN — SODIUM CHLORIDE 206.7 ML: 9 INJECTION, SOLUTION INTRAVENOUS at 12:34

## 2025-08-19 RX ADMIN — ASPIRIN 324 MG: 81 TABLET, CHEWABLE ORAL at 12:35

## 2025-08-19 RX ADMIN — DIPHENHYDRAMINE HYDROCHLORIDE 50 MG: 50 CAPSULE ORAL at 12:35

## 2025-08-20 ENCOUNTER — TELEPHONE (OUTPATIENT)
Dept: CARDIOLOGY | Facility: CLINIC | Age: 85
End: 2025-08-20
Payer: MEDICARE

## (undated) DEVICE — ST INF PRI SMRTSTE 20DRP 2VLV 24ML 117

## (undated) DEVICE — CANN NASL CO2 DIVIDED A/

## (undated) DEVICE — PENCL ES MEGADINE EZ/CLEAN BUTN W/HOLSTR 10FT

## (undated) DEVICE — DECANT BG O JET

## (undated) DEVICE — ADULT, W/LG. BACK PAD, RADIOTRANSPARENT ELEMENT AND LEAD WIRE: Brand: DEFIBRILLATION ELECTRODES

## (undated) DEVICE — GW PERIPH GUIDERIGHT STD/EXCHNG/J/TIP SS 0.035IN 5X260CM

## (undated) DEVICE — SOL NACL 0.9PCT 1000ML

## (undated) DEVICE — SET PRIMARY GRVTY 10DP MALE LL 104IN

## (undated) DEVICE — ST EXT IV SMARTSITE 2VLV SP M LL 5ML IV1

## (undated) DEVICE — INTRO TEAR AWAY/LVD W/SD PRT 10F 13CM

## (undated) DEVICE — CATH COURNARD DECCA CSL 6F120CM

## (undated) DEVICE — TUBING, SUCTION, 1/4" X 10', STRAIGHT: Brand: MEDLINE

## (undated) DEVICE — GUIDE CATHETER: Brand: ACUITY® PRO

## (undated) DEVICE — PK CATH CARD 10

## (undated) DEVICE — MODEL AT P65, P/N 701554-001KIT CONTENTS: HAND CONTROLLER, 3-WAY HIGH-PRESSURE STOPCOCK WITH ROTATING END AND PREMIUM HIGH-PRESSURE TUBING: Brand: ANGIOTOUCH® KIT

## (undated) DEVICE — GLIDESHEATH SLENDER STAINLESS STEEL KIT: Brand: GLIDESHEATH SLENDER

## (undated) DEVICE — MODEL BT2000 P/N 700287-012KIT CONTENTS: MANIFOLD WITH SALINE AND CONTRAST PORTS, SALINE TUBING WITH SPIKE AND HAND SYRINGE, TRANSDUCER: Brand: BT2000 AUTOMATED MANIFOLD KIT

## (undated) DEVICE — CVR PROB ULTRASND/TRANSD W/GEL 7X11IN STRL

## (undated) DEVICE — TR BAND RADIAL ARTERY COMPRESSION DEVICE: Brand: TR BAND

## (undated) DEVICE — DRSNG SURESITE123 4X4.8IN

## (undated) DEVICE — LEX ELECTRO PHYSIOLOGY: Brand: MEDLINE INDUSTRIES, INC.

## (undated) DEVICE — GUIDE WIRE WITH HYDROPHILIC COATING: Brand: ACUITY WHISPER VIEW™

## (undated) DEVICE — DEV COMP RAD PRELUDESYNC 24CM

## (undated) DEVICE — RADIFOCUS GLIDEWIRE: Brand: GLIDEWIRE

## (undated) DEVICE — CAUTERY TIP POLISHER: Brand: DEVON

## (undated) DEVICE — INTRO TEAR AWAY/LVD W/SD PRT 7F 13CM

## (undated) DEVICE — 3M™ STERI-STRIP™ REINFORCED ADHESIVE SKIN CLOSURES, R1547, 1/2 IN X 4 IN (12 MM X 100 MM), 6 STRIPS/ENVELOPE: Brand: 3M™ STERI-STRIP™

## (undated) DEVICE — MEDI-VAC YANKAUER SUCTION HANDLE W/BULBOUS TIP: Brand: CARDINAL HEALTH

## (undated) DEVICE — LIMB HOLDER, WRIST/ANKLE: Brand: DEROYAL

## (undated) DEVICE — CATH DIAG EXPO .056 AL2 6F 100CM

## (undated) DEVICE — CATH DIAG EXPO M/ PK 5F FL4/FR4 PIG

## (undated) DEVICE — CATH F4 INF JL 3.5 100CM: Brand: INFINITI

## (undated) DEVICE — CATH DIAG EXPO .045 FL3.5 5F 100CM

## (undated) DEVICE — Device

## (undated) DEVICE — INTRO TEAR AWAY/LVD W/SD PRT 8F 13CM

## (undated) DEVICE — GW LUGE .014 182 CM

## (undated) DEVICE — BALN PRESS WEDGE 6F 110CM

## (undated) DEVICE — IRRIGATOR BULB ASEPTO 60CC STRL

## (undated) DEVICE — CATH DIAG IMPULSE FL3.5 5F 100CM